# Patient Record
Sex: FEMALE | Race: WHITE | Employment: FULL TIME | URBAN - METROPOLITAN AREA
[De-identification: names, ages, dates, MRNs, and addresses within clinical notes are randomized per-mention and may not be internally consistent; named-entity substitution may affect disease eponyms.]

---

## 2017-04-15 PROCEDURE — 82607 VITAMIN B-12: CPT | Performed by: INTERNAL MEDICINE

## 2017-04-15 PROCEDURE — 82043 UR ALBUMIN QUANTITATIVE: CPT | Performed by: INTERNAL MEDICINE

## 2017-04-15 PROCEDURE — 82570 ASSAY OF URINE CREATININE: CPT | Performed by: INTERNAL MEDICINE

## 2017-08-01 ENCOUNTER — LAB ENCOUNTER (OUTPATIENT)
Dept: LAB | Age: 47
End: 2017-08-01
Attending: INTERNAL MEDICINE
Payer: COMMERCIAL

## 2017-08-01 DIAGNOSIS — E53.8 BIOTIN-(PROPIONYL-COA-CARBOXYLASE) LIGASE DEFICIENCY: Primary | ICD-10-CM

## 2017-08-01 LAB — HAV AB SERPL IA-ACNC: 506 PG/ML (ref 193–986)

## 2017-08-01 PROCEDURE — 82607 VITAMIN B-12: CPT

## 2018-01-16 PROCEDURE — 82746 ASSAY OF FOLIC ACID SERUM: CPT | Performed by: INTERNAL MEDICINE

## 2018-01-16 PROCEDURE — 82607 VITAMIN B-12: CPT | Performed by: INTERNAL MEDICINE

## 2018-01-18 PROCEDURE — 87624 HPV HI-RISK TYP POOLED RSLT: CPT | Performed by: INTERNAL MEDICINE

## 2018-01-18 PROCEDURE — 88175 CYTOPATH C/V AUTO FLUID REDO: CPT | Performed by: INTERNAL MEDICINE

## 2018-05-22 PROCEDURE — 82607 VITAMIN B-12: CPT | Performed by: INTERNAL MEDICINE

## 2018-10-18 PROBLEM — F10.10 ALCOHOL ABUSE: Status: ACTIVE | Noted: 2018-10-18

## 2019-01-18 PROCEDURE — 81001 URINALYSIS AUTO W/SCOPE: CPT | Performed by: INTERNAL MEDICINE

## 2019-02-19 PROBLEM — R07.9 CHEST PAIN, UNSPECIFIED TYPE: Status: ACTIVE | Noted: 2019-02-19

## 2019-05-02 PROBLEM — R07.9 CHEST PAIN, UNSPECIFIED TYPE: Status: RESOLVED | Noted: 2019-02-19 | Resolved: 2019-05-02

## 2020-02-13 PROBLEM — G57.93 NEUROPATHY OF BOTH FEET: Status: ACTIVE | Noted: 2020-02-13

## 2020-02-13 PROBLEM — Z98.890 HISTORY OF ENDOMETRIAL ABLATION: Status: ACTIVE | Noted: 2020-02-13

## 2020-02-13 PROBLEM — D64.9 ANEMIA, UNSPECIFIED TYPE: Status: ACTIVE | Noted: 2020-02-13

## 2020-07-27 PROBLEM — D64.9 ANEMIA, UNSPECIFIED TYPE: Status: RESOLVED | Noted: 2020-02-13 | Resolved: 2020-07-27

## 2020-07-27 PROBLEM — D50.9 IRON DEFICIENCY ANEMIA, UNSPECIFIED IRON DEFICIENCY ANEMIA TYPE: Status: ACTIVE | Noted: 2020-07-27

## 2021-09-16 PROBLEM — R20.0 NUMBNESS IN FEET: Status: ACTIVE | Noted: 2021-09-16

## 2021-09-17 PROBLEM — M77.42 METATARSALGIA OF LEFT FOOT: Status: ACTIVE | Noted: 2021-09-17

## 2021-09-17 PROBLEM — M21.622 TAILOR'S BUNION OF LEFT FOOT: Status: ACTIVE | Noted: 2021-09-17

## 2021-09-17 PROBLEM — L85.3 XEROSIS CUTIS: Status: ACTIVE | Noted: 2021-09-17

## 2021-10-14 PROBLEM — G62.9 POLYNEUROPATHY: Status: ACTIVE | Noted: 2021-10-14

## 2021-10-14 PROBLEM — Z98.84 HISTORY OF GASTRIC BYPASS: Status: ACTIVE | Noted: 2021-10-14

## 2021-10-14 PROBLEM — G62.2 POLYNEUROPATHY DUE TO OTHER TOXIC AGENTS (HCC): Status: ACTIVE | Noted: 2021-10-14

## 2021-10-14 PROBLEM — G62.1 ALCOHOLIC PERIPHERAL NEUROPATHY (HCC): Status: ACTIVE | Noted: 2018-10-18

## 2021-11-03 PROBLEM — Z98.890 STATUS POST FOOT SURGERY: Status: ACTIVE | Noted: 2020-02-13

## 2021-11-03 PROBLEM — Z98.890 STATUS POST LEFT FOOT SURGERY: Status: ACTIVE | Noted: 2020-02-13

## 2021-11-10 PROBLEM — Z48.89 AFTERCARE FOLLOWING SURGERY: Status: ACTIVE | Noted: 2021-11-10

## 2021-12-01 PROBLEM — T81.31XA DEHISCENCE OF OPERATIVE WOUND, INITIAL ENCOUNTER: Status: ACTIVE | Noted: 2021-12-01

## 2022-03-03 PROBLEM — R26.9 GAIT DISTURBANCE: Status: ACTIVE | Noted: 2022-03-03

## 2022-03-03 PROBLEM — R20.2 PARESTHESIAS: Status: ACTIVE | Noted: 2022-03-03

## 2023-03-31 PROBLEM — M86.9 OSTEOMYELITIS (HCC): Status: ACTIVE | Noted: 2023-03-31

## 2023-03-31 RX ORDER — SODIUM CHLORIDE 0.9 % (FLUSH) 0.9 %
10 SYRINGE (ML) INJECTION ONCE
OUTPATIENT
Start: 2023-04-07

## 2023-04-04 ENCOUNTER — TELEPHONE (OUTPATIENT)
Dept: HEMATOLOGY/ONCOLOGY | Facility: HOSPITAL | Age: 53
End: 2023-04-04

## 2023-04-05 ENCOUNTER — DOCUMENTATION ONLY (OUTPATIENT)
Dept: HEMATOLOGY/ONCOLOGY | Facility: HOSPITAL | Age: 53
End: 2023-04-05

## 2023-04-10 ENCOUNTER — NURSE ONLY (OUTPATIENT)
Dept: HEMATOLOGY/ONCOLOGY | Facility: HOSPITAL | Age: 53
End: 2023-04-10
Attending: INTERNAL MEDICINE
Payer: COMMERCIAL

## 2023-04-10 DIAGNOSIS — M86.9 OSTEOMYELITIS (HCC): Primary | ICD-10-CM

## 2023-04-10 LAB
ALBUMIN SERPL-MCNC: 3.5 G/DL (ref 3.4–5)
ALBUMIN/GLOB SERPL: 0.8 {RATIO} (ref 1–2)
ALP LIVER SERPL-CCNC: 90 U/L
ALT SERPL-CCNC: 36 U/L
ANION GAP SERPL CALC-SCNC: 10 MMOL/L (ref 0–18)
AST SERPL-CCNC: 26 U/L (ref 15–37)
BASOPHILS # BLD AUTO: 0.05 X10(3) UL (ref 0–0.2)
BASOPHILS NFR BLD AUTO: 0.5 %
BILIRUB SERPL-MCNC: 0.3 MG/DL (ref 0.1–2)
BUN BLD-MCNC: 10 MG/DL (ref 7–18)
CALCIUM BLD-MCNC: 9.2 MG/DL (ref 8.5–10.1)
CHLORIDE SERPL-SCNC: 106 MMOL/L (ref 98–112)
CK SERPL-CCNC: 145 U/L
CO2 SERPL-SCNC: 24 MMOL/L (ref 21–32)
CREAT BLD-MCNC: 1.19 MG/DL
EOSINOPHIL # BLD AUTO: 0.34 X10(3) UL (ref 0–0.7)
EOSINOPHIL NFR BLD AUTO: 3.7 %
ERYTHROCYTE [DISTWIDTH] IN BLOOD BY AUTOMATED COUNT: 13.1 %
FASTING STATUS PATIENT QL REPORTED: NO
GFR SERPLBLD BASED ON 1.73 SQ M-ARVRAT: 55 ML/MIN/1.73M2 (ref 60–?)
GLOBULIN PLAS-MCNC: 4.2 G/DL (ref 2.8–4.4)
GLUCOSE BLD-MCNC: 126 MG/DL (ref 70–99)
HCT VFR BLD AUTO: 36.2 %
HGB BLD-MCNC: 11.3 G/DL
IMM GRANULOCYTES # BLD AUTO: 0.02 X10(3) UL (ref 0–1)
IMM GRANULOCYTES NFR BLD: 0.2 %
LYMPHOCYTES # BLD AUTO: 2.89 X10(3) UL (ref 1–4)
LYMPHOCYTES NFR BLD AUTO: 31.8 %
MCH RBC QN AUTO: 27.5 PG (ref 26–34)
MCHC RBC AUTO-ENTMCNC: 31.2 G/DL (ref 31–37)
MCV RBC AUTO: 88.1 FL
MONOCYTES # BLD AUTO: 0.96 X10(3) UL (ref 0.1–1)
MONOCYTES NFR BLD AUTO: 10.5 %
NEUTROPHILS # BLD AUTO: 4.84 X10 (3) UL (ref 1.5–7.7)
NEUTROPHILS # BLD AUTO: 4.84 X10(3) UL (ref 1.5–7.7)
NEUTROPHILS NFR BLD AUTO: 53.3 %
OSMOLALITY SERPL CALC.SUM OF ELEC: 291 MOSM/KG (ref 275–295)
PLATELET # BLD AUTO: 271 10(3)UL (ref 150–450)
POTASSIUM SERPL-SCNC: 3.3 MMOL/L (ref 3.5–5.1)
PROT SERPL-MCNC: 7.7 G/DL (ref 6.4–8.2)
RBC # BLD AUTO: 4.11 X10(6)UL
SODIUM SERPL-SCNC: 140 MMOL/L (ref 136–145)
WBC # BLD AUTO: 9.1 X10(3) UL (ref 4–11)

## 2023-04-10 PROCEDURE — 85025 COMPLETE CBC W/AUTO DIFF WBC: CPT

## 2023-04-10 PROCEDURE — 80053 COMPREHEN METABOLIC PANEL: CPT

## 2023-04-10 PROCEDURE — 36592 COLLECT BLOOD FROM PICC: CPT

## 2023-04-10 PROCEDURE — 82550 ASSAY OF CK (CPK): CPT

## 2023-04-10 RX ORDER — SODIUM CHLORIDE 0.9 % (FLUSH) 0.9 %
10 SYRINGE (ML) INJECTION ONCE
OUTPATIENT
Start: 2023-04-17

## 2023-04-10 NOTE — PROGRESS NOTES
Education Record    Learner:  Patient    Disease / Diagnosis: here for labs    Barriers / Limitations:  None   Comments:    Method:  Brief focused and Discussion   Comments:    General Topics:  Procedure and Plan of care reviewed   Comments:    Outcome:  Shows understanding   Comments:  Labs drawn without incident. PICC with stat lock in place upon arrival. PICC dressing changed per protocol with tissue adhesive. Stat lock not used. Extension tubing added per pt request. Bruising noted to inner right arm above insertion site. Pt states they had a hard time placing PICC and tried twice. Pt with continuous pump for antibiotics. Pt stopped antibiotic for lab draw and then restarted. Reviewed next appointment. Discharged home in stable condition, no new complaints.

## 2023-04-17 ENCOUNTER — NURSE ONLY (OUTPATIENT)
Dept: HEMATOLOGY/ONCOLOGY | Facility: HOSPITAL | Age: 53
End: 2023-04-17
Attending: INTERNAL MEDICINE
Payer: COMMERCIAL

## 2023-04-17 DIAGNOSIS — M86.9 OSTEOMYELITIS (HCC): Primary | ICD-10-CM

## 2023-04-17 LAB
ALBUMIN SERPL-MCNC: 3.4 G/DL (ref 3.4–5)
ALBUMIN/GLOB SERPL: 0.8 {RATIO} (ref 1–2)
ALP LIVER SERPL-CCNC: 92 U/L
ALT SERPL-CCNC: 34 U/L
ANION GAP SERPL CALC-SCNC: 7 MMOL/L (ref 0–18)
AST SERPL-CCNC: 36 U/L (ref 15–37)
BASOPHILS # BLD AUTO: 0.05 X10(3) UL (ref 0–0.2)
BASOPHILS NFR BLD AUTO: 0.5 %
BILIRUB SERPL-MCNC: 0.3 MG/DL (ref 0.1–2)
BUN BLD-MCNC: 14 MG/DL (ref 7–18)
CALCIUM BLD-MCNC: 9 MG/DL (ref 8.5–10.1)
CHLORIDE SERPL-SCNC: 108 MMOL/L (ref 98–112)
CK SERPL-CCNC: 180 U/L
CO2 SERPL-SCNC: 21 MMOL/L (ref 21–32)
CREAT BLD-MCNC: 1.33 MG/DL
EOSINOPHIL # BLD AUTO: 0.29 X10(3) UL (ref 0–0.7)
EOSINOPHIL NFR BLD AUTO: 3.2 %
ERYTHROCYTE [DISTWIDTH] IN BLOOD BY AUTOMATED COUNT: 13.2 %
FASTING STATUS PATIENT QL REPORTED: NO
GFR SERPLBLD BASED ON 1.73 SQ M-ARVRAT: 48 ML/MIN/1.73M2 (ref 60–?)
GLOBULIN PLAS-MCNC: 4.3 G/DL (ref 2.8–4.4)
GLUCOSE BLD-MCNC: 192 MG/DL (ref 70–99)
HCT VFR BLD AUTO: 36.7 %
HGB BLD-MCNC: 11.5 G/DL
IMM GRANULOCYTES # BLD AUTO: 0.03 X10(3) UL (ref 0–1)
IMM GRANULOCYTES NFR BLD: 0.3 %
LYMPHOCYTES # BLD AUTO: 2.93 X10(3) UL (ref 1–4)
LYMPHOCYTES NFR BLD AUTO: 31.8 %
MCH RBC QN AUTO: 27.6 PG (ref 26–34)
MCHC RBC AUTO-ENTMCNC: 31.3 G/DL (ref 31–37)
MCV RBC AUTO: 88.2 FL
MONOCYTES # BLD AUTO: 0.93 X10(3) UL (ref 0.1–1)
MONOCYTES NFR BLD AUTO: 10.1 %
NEUTROPHILS # BLD AUTO: 4.97 X10 (3) UL (ref 1.5–7.7)
NEUTROPHILS # BLD AUTO: 4.97 X10(3) UL (ref 1.5–7.7)
NEUTROPHILS NFR BLD AUTO: 54.1 %
OSMOLALITY SERPL CALC.SUM OF ELEC: 288 MOSM/KG (ref 275–295)
PLATELET # BLD AUTO: 286 10(3)UL (ref 150–450)
POTASSIUM SERPL-SCNC: 3.7 MMOL/L (ref 3.5–5.1)
PROT SERPL-MCNC: 7.7 G/DL (ref 6.4–8.2)
RBC # BLD AUTO: 4.16 X10(6)UL
SODIUM SERPL-SCNC: 136 MMOL/L (ref 136–145)
WBC # BLD AUTO: 9.2 X10(3) UL (ref 4–11)

## 2023-04-17 PROCEDURE — 36592 COLLECT BLOOD FROM PICC: CPT

## 2023-04-17 PROCEDURE — 82550 ASSAY OF CK (CPK): CPT

## 2023-04-17 PROCEDURE — 85025 COMPLETE CBC W/AUTO DIFF WBC: CPT

## 2023-04-17 PROCEDURE — 80053 COMPREHEN METABOLIC PANEL: CPT

## 2023-04-17 RX ORDER — SODIUM CHLORIDE 0.9 % (FLUSH) 0.9 %
10 SYRINGE (ML) INJECTION ONCE
OUTPATIENT
Start: 2023-04-24

## 2023-04-17 NOTE — PROGRESS NOTES
Education Record    Learner:  Patient    Disease / Diagnosis: osteomyelitis    Barriers / Limitations:  None    Method:  Brief focused, printed material and  reinforcement    General Topics:  Plan of care reviewed    Outcome:  Shows understanding    PICC dressing changed, labs drawn, pt with continuous zosyn pump.  Has next appt

## 2023-04-24 ENCOUNTER — TELEPHONE (OUTPATIENT)
Dept: HEMATOLOGY/ONCOLOGY | Facility: HOSPITAL | Age: 53
End: 2023-04-24

## 2023-04-24 ENCOUNTER — NURSE ONLY (OUTPATIENT)
Dept: HEMATOLOGY/ONCOLOGY | Facility: HOSPITAL | Age: 53
End: 2023-04-24
Attending: INTERNAL MEDICINE
Payer: COMMERCIAL

## 2023-04-24 DIAGNOSIS — M86.9 OSTEOMYELITIS (HCC): Primary | ICD-10-CM

## 2023-04-24 LAB
ALBUMIN SERPL-MCNC: 3.6 G/DL (ref 3.4–5)
ALBUMIN/GLOB SERPL: 0.8 {RATIO} (ref 1–2)
ALP LIVER SERPL-CCNC: 97 U/L
ALT SERPL-CCNC: 40 U/L
ANION GAP SERPL CALC-SCNC: 14 MMOL/L (ref 0–18)
AST SERPL-CCNC: 36 U/L (ref 15–37)
BASOPHILS # BLD AUTO: 0.06 X10(3) UL (ref 0–0.2)
BASOPHILS NFR BLD AUTO: 0.6 %
BILIRUB SERPL-MCNC: 0.4 MG/DL (ref 0.1–2)
BUN BLD-MCNC: 11 MG/DL (ref 7–18)
CALCIUM BLD-MCNC: 9 MG/DL (ref 8.5–10.1)
CHLORIDE SERPL-SCNC: 104 MMOL/L (ref 98–112)
CK SERPL-CCNC: 202 U/L
CO2 SERPL-SCNC: 21 MMOL/L (ref 21–32)
CREAT BLD-MCNC: 1.39 MG/DL
EOSINOPHIL # BLD AUTO: 0.53 X10(3) UL (ref 0–0.7)
EOSINOPHIL NFR BLD AUTO: 5.1 %
ERYTHROCYTE [DISTWIDTH] IN BLOOD BY AUTOMATED COUNT: 13.2 %
FASTING STATUS PATIENT QL REPORTED: NO
GFR SERPLBLD BASED ON 1.73 SQ M-ARVRAT: 45 ML/MIN/1.73M2 (ref 60–?)
GLOBULIN PLAS-MCNC: 4.4 G/DL (ref 2.8–4.4)
GLUCOSE BLD-MCNC: 207 MG/DL (ref 70–99)
HCT VFR BLD AUTO: 37.4 %
HGB BLD-MCNC: 11.9 G/DL
IMM GRANULOCYTES # BLD AUTO: 0.02 X10(3) UL (ref 0–1)
IMM GRANULOCYTES NFR BLD: 0.2 %
LYMPHOCYTES # BLD AUTO: 3.52 X10(3) UL (ref 1–4)
LYMPHOCYTES NFR BLD AUTO: 33.6 %
MCH RBC QN AUTO: 27.9 PG (ref 26–34)
MCHC RBC AUTO-ENTMCNC: 31.8 G/DL (ref 31–37)
MCV RBC AUTO: 87.8 FL
MONOCYTES # BLD AUTO: 1.07 X10(3) UL (ref 0.1–1)
MONOCYTES NFR BLD AUTO: 10.2 %
NEUTROPHILS # BLD AUTO: 5.29 X10 (3) UL (ref 1.5–7.7)
NEUTROPHILS # BLD AUTO: 5.29 X10(3) UL (ref 1.5–7.7)
NEUTROPHILS NFR BLD AUTO: 50.3 %
OSMOLALITY SERPL CALC.SUM OF ELEC: 293 MOSM/KG (ref 275–295)
PLATELET # BLD AUTO: 270 10(3)UL (ref 150–450)
POTASSIUM SERPL-SCNC: 2.7 MMOL/L (ref 3.5–5.1)
PROT SERPL-MCNC: 8 G/DL (ref 6.4–8.2)
RBC # BLD AUTO: 4.26 X10(6)UL
SODIUM SERPL-SCNC: 139 MMOL/L (ref 136–145)
WBC # BLD AUTO: 10.5 X10(3) UL (ref 4–11)

## 2023-04-24 PROCEDURE — 80053 COMPREHEN METABOLIC PANEL: CPT

## 2023-04-24 PROCEDURE — 82550 ASSAY OF CK (CPK): CPT

## 2023-04-24 PROCEDURE — 36592 COLLECT BLOOD FROM PICC: CPT

## 2023-04-24 PROCEDURE — 85025 COMPLETE CBC W/AUTO DIFF WBC: CPT

## 2023-04-24 RX ORDER — SODIUM CHLORIDE 0.9 % (FLUSH) 0.9 %
10 SYRINGE (ML) INJECTION ONCE
OUTPATIENT
Start: 2023-05-01

## 2023-04-24 NOTE — PROGRESS NOTES
PICC line dressing changed, Continuous infusion pump ws stopped by patient while dressing was changed.

## 2023-05-01 ENCOUNTER — NURSE ONLY (OUTPATIENT)
Dept: HEMATOLOGY/ONCOLOGY | Facility: HOSPITAL | Age: 53
End: 2023-05-01
Attending: INTERNAL MEDICINE
Payer: COMMERCIAL

## 2023-05-01 DIAGNOSIS — M86.9 OSTEOMYELITIS (HCC): Primary | ICD-10-CM

## 2023-05-01 LAB
ALBUMIN SERPL-MCNC: 3.5 G/DL (ref 3.4–5)
ALBUMIN/GLOB SERPL: 0.8 {RATIO} (ref 1–2)
ALP LIVER SERPL-CCNC: 101 U/L
ALT SERPL-CCNC: 35 U/L
ANION GAP SERPL CALC-SCNC: 5 MMOL/L (ref 0–18)
AST SERPL-CCNC: 36 U/L (ref 15–37)
BASOPHILS # BLD AUTO: 0.05 X10(3) UL (ref 0–0.2)
BASOPHILS NFR BLD AUTO: 0.5 %
BILIRUB SERPL-MCNC: 0.3 MG/DL (ref 0.1–2)
BUN BLD-MCNC: 12 MG/DL (ref 7–18)
CALCIUM BLD-MCNC: 9.1 MG/DL (ref 8.5–10.1)
CHLORIDE SERPL-SCNC: 109 MMOL/L (ref 98–112)
CK SERPL-CCNC: 132 U/L
CO2 SERPL-SCNC: 21 MMOL/L (ref 21–32)
CREAT BLD-MCNC: 1.62 MG/DL
EOSINOPHIL # BLD AUTO: 0.38 X10(3) UL (ref 0–0.7)
EOSINOPHIL NFR BLD AUTO: 3.7 %
ERYTHROCYTE [DISTWIDTH] IN BLOOD BY AUTOMATED COUNT: 13.2 %
GFR SERPLBLD BASED ON 1.73 SQ M-ARVRAT: 38 ML/MIN/1.73M2 (ref 60–?)
GLOBULIN PLAS-MCNC: 4.2 G/DL (ref 2.8–4.4)
GLUCOSE BLD-MCNC: 201 MG/DL (ref 70–99)
HCT VFR BLD AUTO: 38.4 %
HGB BLD-MCNC: 12 G/DL
IMM GRANULOCYTES # BLD AUTO: 0.02 X10(3) UL (ref 0–1)
IMM GRANULOCYTES NFR BLD: 0.2 %
LYMPHOCYTES # BLD AUTO: 3.41 X10(3) UL (ref 1–4)
LYMPHOCYTES NFR BLD AUTO: 32.9 %
MCH RBC QN AUTO: 27.6 PG (ref 26–34)
MCHC RBC AUTO-ENTMCNC: 31.3 G/DL (ref 31–37)
MCV RBC AUTO: 88.3 FL
MONOCYTES # BLD AUTO: 0.9 X10(3) UL (ref 0.1–1)
MONOCYTES NFR BLD AUTO: 8.7 %
NEUTROPHILS # BLD AUTO: 5.6 X10 (3) UL (ref 1.5–7.7)
NEUTROPHILS # BLD AUTO: 5.6 X10(3) UL (ref 1.5–7.7)
NEUTROPHILS NFR BLD AUTO: 54 %
OSMOLALITY SERPL CALC.SUM OF ELEC: 285 MOSM/KG (ref 275–295)
PLATELET # BLD AUTO: 290 10(3)UL (ref 150–450)
POTASSIUM SERPL-SCNC: 4 MMOL/L (ref 3.5–5.1)
PROT SERPL-MCNC: 7.7 G/DL (ref 6.4–8.2)
RBC # BLD AUTO: 4.35 X10(6)UL
SODIUM SERPL-SCNC: 135 MMOL/L (ref 136–145)
WBC # BLD AUTO: 10.4 X10(3) UL (ref 4–11)

## 2023-05-01 PROCEDURE — 85025 COMPLETE CBC W/AUTO DIFF WBC: CPT

## 2023-05-01 PROCEDURE — 82550 ASSAY OF CK (CPK): CPT

## 2023-05-01 PROCEDURE — 36592 COLLECT BLOOD FROM PICC: CPT

## 2023-05-01 PROCEDURE — 80053 COMPREHEN METABOLIC PANEL: CPT

## 2023-05-01 RX ORDER — SODIUM CHLORIDE 0.9 % (FLUSH) 0.9 %
10 SYRINGE (ML) INJECTION ONCE
OUTPATIENT
Start: 2023-05-08

## 2023-05-01 NOTE — PROGRESS NOTES
Education Record    Learner:  Patient    Disease / Diagnosis: Pt here for CLL, dressing change. Barriers / Limitations:  None    Method:  Brief focused, printed material and  reinforcement    General Topics:  Plan of care reviewed    Outcome:  Shows understanding    Per pt request, new extension tubing applied. Pt labs drawn, abx infusion pump paused. Noticed slight redness around insertion site and surrounding skin. Pt new dressing applied. Pt left in stable condition.

## 2023-05-05 ENCOUNTER — OFFICE VISIT (OUTPATIENT)
Dept: WOUND CARE | Facility: HOSPITAL | Age: 53
End: 2023-05-05
Attending: INTERNAL MEDICINE
Payer: COMMERCIAL

## 2023-05-05 VITALS
DIASTOLIC BLOOD PRESSURE: 94 MMHG | WEIGHT: 195 LBS | TEMPERATURE: 97 F | RESPIRATION RATE: 18 BRPM | BODY MASS INDEX: 32.49 KG/M2 | HEIGHT: 65 IN | HEART RATE: 76 BPM | SYSTOLIC BLOOD PRESSURE: 159 MMHG

## 2023-05-05 DIAGNOSIS — T14.8XXA DEEP TISSUE INJURY: ICD-10-CM

## 2023-05-05 DIAGNOSIS — L97.522 DIABETIC ULCER OF TOE OF LEFT FOOT ASSOCIATED WITH TYPE 2 DIABETES MELLITUS, WITH FAT LAYER EXPOSED (HCC): ICD-10-CM

## 2023-05-05 DIAGNOSIS — E11.621 DIABETIC ULCER OF TOE OF LEFT FOOT ASSOCIATED WITH TYPE 2 DIABETES MELLITUS, WITH FAT LAYER EXPOSED (HCC): ICD-10-CM

## 2023-05-05 DIAGNOSIS — M86.172 ACUTE OSTEOMYELITIS OF LEFT FOOT (HCC): ICD-10-CM

## 2023-05-05 DIAGNOSIS — L97.522 ULCER OF LEFT FOOT, WITH FAT LAYER EXPOSED (HCC): Primary | ICD-10-CM

## 2023-05-05 LAB — GLUCOSE BLD-MCNC: 116 MG/DL (ref 70–99)

## 2023-05-05 PROCEDURE — 99214 OFFICE O/P EST MOD 30 MIN: CPT

## 2023-05-05 PROCEDURE — 11042 DBRDMT SUBQ TIS 1ST 20SQCM/<: CPT | Performed by: INTERNAL MEDICINE

## 2023-05-05 PROCEDURE — 82962 GLUCOSE BLOOD TEST: CPT | Performed by: INTERNAL MEDICINE

## 2023-05-05 NOTE — PATIENT INSTRUCTIONS
Wound Cleaning and Dressings:    Wash your hands with soap and water. Always wear gloves while changing dressings. Donot touch wound / francisco-wound skin with un-gloved hands. Remove old dressing, discard and place into trash. Shower with protection  Wash foot with soap and water  Wash wonded area with saline  VASHE soak x 15 min before dressing change  DRESSINGS: honey gel / gauze. Betadine on right toe DTI  Change dressing daily. Off-Loading: Darco shoe with PEG liner    Miscellaneous Instructions:  Supplement with a daily multivitamin   Low salt diet  Intense blood sugar control - Goal Blood sugar below 180 at all times recommended. Increase protein intake / consider protein supplements - see below  Elevate extremities at all times when sitting / laying down. DIETARY MODIFICATIONS TO HELP WITH WOUND HEALING:    Protein: Meats, beans, eggs, milk and yogurt particularly [place] yogurt), tofu, soy nuts, soy protein products    Vitamin C: Citrus fruits and juices, strawberries, tomatoes, tomato juice, peppers, baked potatoes, spinach, broccoli, cauliflower, [place] sprouts, cabbage    Vitamin A: Dark green, leafy vegetables, orange or yellow vegetables, cantaloupe, fortified dairy products, liver, fortified cereals    Zinc: Fortified cereals, red meats, seafood    Consider Jason [employer] (These are essential branch chain amino acids that help with tissue building and wound healing) and take 2 packets/day. you can order online at [employer] or [address] [place] [address] [employer] REMINDERS:    The treatment plan has been discussed at length with you and your provider. Follow all instructions carefully, it is very important. If you do not follow all instructions, you are at  risk of your wound not healing, infection, possible loss of limb and even end of life.   Please call the clinic during regular business hours ( 7:30 AM - 5:30 PM) if you notice increased bleeding, redness, warmth, pain or pus like drainage or start running a fever greater than 100.3. For after hour emergencies, please call your primary physician or go to the nearest emergency room.

## 2023-05-05 NOTE — PROGRESS NOTES
.Weekly Wound Education Note    Teaching Provided To: Patient  Training Topics: Discharge instructions;Dressing;Cleasing and general instructions; Off-loading  Training Method: Explain/Verbal;Written  Training Response: Patient responds and understands        Notes: Inital visit for toe wounds. Start betadine to right toe. Start honey gel, gauze, and medipore tape to left toe. Darco shoe with peg liner made for left foot. Vascular studies ordered this visit. If wounds not improving, consider HBOT.

## 2023-05-05 NOTE — PROGRESS NOTES
Patient ID: Kanu Hall is a 48year old female. Debridement   Wound 05/05/23 #1 left plantar 3rd toe Diabetic Ulcer Toe (Comment which one) Plantar;Left    Consent obtained? verbal  Consent given by: patient  Risks discussed?  procedural risks discussed  Performed by: provider  Debridement type: surgical  Level of debridement: subcutaneous tissue    Pre-debridement measurements  Length (cm): 0.4  Width (cm): 0.9  Depth (cm): 0.1  Surface Area (cm^2): 0.36  Volume (cm^3): 0.04    Post-debridement measurements  Length (cm): 0.5  Width (cm): 1  Depth (cm): 0.2  Percent debrided: 100%  Surface Area (cm^2): 0.5  Area debrided (cm^2): 0.5  Volume (cm^3): 0.1  Tissue and other material debrided: subcutaneous tissue  Devitalized tissue debrided: biofilm, callus and slough  Instrument(s) utilized: blade and forceps  Bleeding: small  Hemostasis obtained with: pressure  Procedural pain (0-10): 0  Post-procedural pain: 1   Response to treatment: procedure was tolerated well

## 2023-05-08 ENCOUNTER — NURSE ONLY (OUTPATIENT)
Dept: HEMATOLOGY/ONCOLOGY | Facility: HOSPITAL | Age: 53
End: 2023-05-08
Attending: INTERNAL MEDICINE
Payer: COMMERCIAL

## 2023-05-08 DIAGNOSIS — M86.9 OSTEOMYELITIS (HCC): Primary | ICD-10-CM

## 2023-05-08 LAB
ALBUMIN SERPL-MCNC: 3.4 G/DL (ref 3.4–5)
ALBUMIN/GLOB SERPL: 0.8 {RATIO} (ref 1–2)
ALP LIVER SERPL-CCNC: 93 U/L
ALT SERPL-CCNC: 49 U/L
ANION GAP SERPL CALC-SCNC: 9 MMOL/L (ref 0–18)
AST SERPL-CCNC: 50 U/L (ref 15–37)
BASOPHILS # BLD AUTO: 0.06 X10(3) UL (ref 0–0.2)
BASOPHILS NFR BLD AUTO: 0.6 %
BILIRUB SERPL-MCNC: 0.4 MG/DL (ref 0.1–2)
BUN BLD-MCNC: 10 MG/DL (ref 7–18)
CALCIUM BLD-MCNC: 8.9 MG/DL (ref 8.5–10.1)
CHLORIDE SERPL-SCNC: 110 MMOL/L (ref 98–112)
CK SERPL-CCNC: 121 U/L
CO2 SERPL-SCNC: 22 MMOL/L (ref 21–32)
CREAT BLD-MCNC: 1.28 MG/DL
EOSINOPHIL # BLD AUTO: 0.26 X10(3) UL (ref 0–0.7)
EOSINOPHIL NFR BLD AUTO: 2.4 %
ERYTHROCYTE [DISTWIDTH] IN BLOOD BY AUTOMATED COUNT: 13.2 %
GFR SERPLBLD BASED ON 1.73 SQ M-ARVRAT: 50 ML/MIN/1.73M2 (ref 60–?)
GLOBULIN PLAS-MCNC: 4.1 G/DL (ref 2.8–4.4)
GLUCOSE BLD-MCNC: 129 MG/DL (ref 70–99)
HCT VFR BLD AUTO: 37.3 %
HGB BLD-MCNC: 11.8 G/DL
IMM GRANULOCYTES # BLD AUTO: 0.03 X10(3) UL (ref 0–1)
IMM GRANULOCYTES NFR BLD: 0.3 %
LYMPHOCYTES # BLD AUTO: 3.37 X10(3) UL (ref 1–4)
LYMPHOCYTES NFR BLD AUTO: 31.2 %
MCH RBC QN AUTO: 27.3 PG (ref 26–34)
MCHC RBC AUTO-ENTMCNC: 31.6 G/DL (ref 31–37)
MCV RBC AUTO: 86.3 FL
MONOCYTES # BLD AUTO: 1.11 X10(3) UL (ref 0.1–1)
MONOCYTES NFR BLD AUTO: 10.3 %
NEUTROPHILS # BLD AUTO: 5.96 X10 (3) UL (ref 1.5–7.7)
NEUTROPHILS # BLD AUTO: 5.96 X10(3) UL (ref 1.5–7.7)
NEUTROPHILS NFR BLD AUTO: 55.2 %
OSMOLALITY SERPL CALC.SUM OF ELEC: 293 MOSM/KG (ref 275–295)
PLATELET # BLD AUTO: 282 10(3)UL (ref 150–450)
POTASSIUM SERPL-SCNC: 4 MMOL/L (ref 3.5–5.1)
PROT SERPL-MCNC: 7.5 G/DL (ref 6.4–8.2)
RBC # BLD AUTO: 4.32 X10(6)UL
SODIUM SERPL-SCNC: 141 MMOL/L (ref 136–145)
WBC # BLD AUTO: 10.8 X10(3) UL (ref 4–11)

## 2023-05-08 PROCEDURE — 85025 COMPLETE CBC W/AUTO DIFF WBC: CPT

## 2023-05-08 PROCEDURE — 82550 ASSAY OF CK (CPK): CPT

## 2023-05-08 PROCEDURE — 80053 COMPREHEN METABOLIC PANEL: CPT

## 2023-05-08 PROCEDURE — 36592 COLLECT BLOOD FROM PICC: CPT

## 2023-05-08 RX ORDER — SODIUM CHLORIDE 0.9 % (FLUSH) 0.9 %
10 SYRINGE (ML) INJECTION ONCE
Status: DISCONTINUED | OUTPATIENT
Start: 2023-05-08 | End: 2023-05-08

## 2023-05-08 RX ORDER — SODIUM CHLORIDE 0.9 % (FLUSH) 0.9 %
10 SYRINGE (ML) INJECTION ONCE
OUTPATIENT
Start: 2023-05-15

## 2023-05-08 NOTE — PROGRESS NOTES
Education Record    Learner:  Patient    Disease / Diagnosis: Pt here for labs and dressing change    Barriers / Limitations:  None    Method:  Brief focused, printed material and  reinforcement    General Topics:  Plan of care reviewed    Outcome:  Shows understanding    Per pt request, new extension tubing applied. Pt labs drawn, abx pump paused. There is still redness around insertion site and surrounding skin. Pt denies pain or discomfort. Applied new dressing. Pt left in stable condition.

## 2023-05-15 ENCOUNTER — NURSE ONLY (OUTPATIENT)
Dept: HEMATOLOGY/ONCOLOGY | Facility: HOSPITAL | Age: 53
End: 2023-05-15
Attending: INTERNAL MEDICINE
Payer: COMMERCIAL

## 2023-05-15 DIAGNOSIS — M86.9 OSTEOMYELITIS (HCC): Primary | ICD-10-CM

## 2023-05-15 LAB
ALBUMIN SERPL-MCNC: 3.3 G/DL (ref 3.4–5)
ALBUMIN/GLOB SERPL: 0.8 {RATIO} (ref 1–2)
ALP LIVER SERPL-CCNC: 98 U/L
ALT SERPL-CCNC: 56 U/L
ANION GAP SERPL CALC-SCNC: 12 MMOL/L (ref 0–18)
AST SERPL-CCNC: 61 U/L (ref 15–37)
BASOPHILS # BLD AUTO: 0.05 X10(3) UL (ref 0–0.2)
BASOPHILS NFR BLD AUTO: 0.5 %
BILIRUB SERPL-MCNC: 0.3 MG/DL (ref 0.1–2)
BUN BLD-MCNC: 10 MG/DL (ref 7–18)
CALCIUM BLD-MCNC: 8.7 MG/DL (ref 8.5–10.1)
CHLORIDE SERPL-SCNC: 106 MMOL/L (ref 98–112)
CK SERPL-CCNC: 111 U/L
CO2 SERPL-SCNC: 19 MMOL/L (ref 21–32)
CREAT BLD-MCNC: 1.47 MG/DL
EOSINOPHIL # BLD AUTO: 0.25 X10(3) UL (ref 0–0.7)
EOSINOPHIL NFR BLD AUTO: 2.7 %
ERYTHROCYTE [DISTWIDTH] IN BLOOD BY AUTOMATED COUNT: 13.2 %
FASTING STATUS PATIENT QL REPORTED: NO
GFR SERPLBLD BASED ON 1.73 SQ M-ARVRAT: 42 ML/MIN/1.73M2 (ref 60–?)
GLOBULIN PLAS-MCNC: 4.2 G/DL (ref 2.8–4.4)
GLUCOSE BLD-MCNC: 243 MG/DL (ref 70–99)
HCT VFR BLD AUTO: 37.6 %
HGB BLD-MCNC: 11.7 G/DL
IMM GRANULOCYTES # BLD AUTO: 0.01 X10(3) UL (ref 0–1)
IMM GRANULOCYTES NFR BLD: 0.1 %
LYMPHOCYTES # BLD AUTO: 3.09 X10(3) UL (ref 1–4)
LYMPHOCYTES NFR BLD AUTO: 33.1 %
MCH RBC QN AUTO: 27.1 PG (ref 26–34)
MCHC RBC AUTO-ENTMCNC: 31.1 G/DL (ref 31–37)
MCV RBC AUTO: 87.2 FL
MONOCYTES # BLD AUTO: 0.97 X10(3) UL (ref 0.1–1)
MONOCYTES NFR BLD AUTO: 10.4 %
NEUTROPHILS # BLD AUTO: 4.97 X10 (3) UL (ref 1.5–7.7)
NEUTROPHILS # BLD AUTO: 4.97 X10(3) UL (ref 1.5–7.7)
NEUTROPHILS NFR BLD AUTO: 53.2 %
OSMOLALITY SERPL CALC.SUM OF ELEC: 291 MOSM/KG (ref 275–295)
PLATELET # BLD AUTO: 287 10(3)UL (ref 150–450)
POTASSIUM SERPL-SCNC: 3.1 MMOL/L (ref 3.5–5.1)
PROT SERPL-MCNC: 7.5 G/DL (ref 6.4–8.2)
RBC # BLD AUTO: 4.31 X10(6)UL
SODIUM SERPL-SCNC: 137 MMOL/L (ref 136–145)
WBC # BLD AUTO: 9.3 X10(3) UL (ref 4–11)

## 2023-05-15 PROCEDURE — 85025 COMPLETE CBC W/AUTO DIFF WBC: CPT

## 2023-05-15 PROCEDURE — 36592 COLLECT BLOOD FROM PICC: CPT

## 2023-05-15 PROCEDURE — 80053 COMPREHEN METABOLIC PANEL: CPT

## 2023-05-15 PROCEDURE — 82550 ASSAY OF CK (CPK): CPT

## 2023-05-15 RX ORDER — SODIUM CHLORIDE 0.9 % (FLUSH) 0.9 %
10 SYRINGE (ML) INJECTION ONCE
OUTPATIENT
Start: 2023-05-22

## 2023-05-15 NOTE — PROGRESS NOTES
Education Record    Learner:  Patient    Disease / Diagnosis: here for labs and dressing change    Barriers / Limitations:  None   Comments:    Method:  Brief focused and Discussion   Comments:    General Topics:  Plan of care reviewed   Comments:    Outcome:  Shows understanding   Comments:    Pt stopped continuous antibiotic for lab draw and dressing change and restarted once completed. Labs drawn and dressing changed per protocol. Extension tubing and blue clave changed. Some redness noted at insertion site and under tubing. Pt denies pain or fevers. Unchanged per patient. Weekly lab and dressing changes to continue until toe amputated per pt. Per charge RN, Riri Dumas, new order faxed over per MD today. Reviewed next appointments. Discharged in stable condition, no new complaints.

## 2023-05-19 ENCOUNTER — APPOINTMENT (OUTPATIENT)
Dept: WOUND CARE | Facility: HOSPITAL | Age: 53
End: 2023-05-19
Attending: INTERNAL MEDICINE
Payer: COMMERCIAL

## 2023-05-20 ENCOUNTER — HOSPITAL ENCOUNTER (EMERGENCY)
Facility: HOSPITAL | Age: 53
Discharge: HOME | End: 2023-05-20
Attending: EMERGENCY MEDICINE
Payer: COMMERCIAL

## 2023-05-20 VITALS
HEART RATE: 78 BPM | HEIGHT: 65 IN | DIASTOLIC BLOOD PRESSURE: 78 MMHG | TEMPERATURE: 97 F | BODY MASS INDEX: 32.82 KG/M2 | SYSTOLIC BLOOD PRESSURE: 114 MMHG | RESPIRATION RATE: 20 BRPM | OXYGEN SATURATION: 98 % | WEIGHT: 197 LBS

## 2023-05-20 DIAGNOSIS — M86.9 OSTEOMYELITIS, UNSPECIFIED SITE, UNSPECIFIED TYPE (HCC): Primary | ICD-10-CM

## 2023-05-20 DIAGNOSIS — Z45.2 PICC (PERIPHERALLY INSERTED CENTRAL CATHETER) REMOVAL: ICD-10-CM

## 2023-05-20 RX ORDER — DOXYCYCLINE HYCLATE 100 MG/1
100 CAPSULE ORAL 2 TIMES DAILY
Qty: 28 CAPSULE | Refills: 0 | Status: SHIPPED | OUTPATIENT
Start: 2023-05-20 | End: 2023-05-29

## 2023-05-20 RX ORDER — DOXYCYCLINE HYCLATE 100 MG/1
100 CAPSULE ORAL ONCE
Status: COMPLETED | OUTPATIENT
Start: 2023-05-20 | End: 2023-05-20

## 2023-05-20 RX ORDER — AMOXICILLIN AND CLAVULANATE POTASSIUM 875; 125 MG/1; MG/1
1 TABLET, FILM COATED ORAL 2 TIMES DAILY
Qty: 28 TABLET | Refills: 0 | Status: SHIPPED | OUTPATIENT
Start: 2023-05-20 | End: 2023-05-29

## 2023-05-20 RX ORDER — AMOXICILLIN AND CLAVULANATE POTASSIUM 875; 125 MG/1; MG/1
875 TABLET, FILM COATED ORAL ONCE
Status: COMPLETED | OUTPATIENT
Start: 2023-05-20 | End: 2023-05-20

## 2023-05-20 NOTE — ED QUICK NOTES
Attempted to flush PICC line. Site leaking just above device securement site. No blood return noted.

## 2023-05-20 NOTE — DISCHARGE INSTRUCTIONS
Continue Augmentin and doxycycline twice a day unless directed otherwise by Dr. Tomas Dose  Return for any problems

## 2023-05-20 NOTE — ED INITIAL ASSESSMENT (HPI)
Pt noticed today that her PICC line has been slowly coming out of its site. Line is also leaking in front of butterfly. Denies any SOB, chest discomfort.

## 2023-05-21 NOTE — ED QUICK NOTES
Right PICC line removed per PICC like removal policy. Pt tolerated well. Resting comfortably supine in bed. PICC measuring 47 cm and catheter tip intact.

## 2023-05-22 ENCOUNTER — APPOINTMENT (OUTPATIENT)
Dept: HEMATOLOGY/ONCOLOGY | Facility: HOSPITAL | Age: 53
End: 2023-05-22
Attending: INTERNAL MEDICINE
Payer: COMMERCIAL

## 2023-05-25 ENCOUNTER — HOSPITAL ENCOUNTER (INPATIENT)
Facility: HOSPITAL | Age: 53
LOS: 4 days | Discharge: HOME | DRG: 618 | End: 2023-05-29
Attending: STUDENT IN AN ORGANIZED HEALTH CARE EDUCATION/TRAINING PROGRAM | Admitting: HOSPITALIST
Payer: COMMERCIAL

## 2023-05-25 DIAGNOSIS — E11.69 TYPE 2 DIABETES MELLITUS WITH OTHER SPECIFIED COMPLICATION, WITHOUT LONG-TERM CURRENT USE OF INSULIN (HCC): ICD-10-CM

## 2023-05-25 DIAGNOSIS — I10 HYPERTENSION, UNSPECIFIED TYPE: ICD-10-CM

## 2023-05-25 DIAGNOSIS — R52 PAIN: ICD-10-CM

## 2023-05-25 DIAGNOSIS — M86.9 OSTEOMYELITIS OF LEFT FOOT, UNSPECIFIED TYPE (HCC): Primary | ICD-10-CM

## 2023-05-25 LAB
ALBUMIN SERPL-MCNC: 3.6 G/DL (ref 3.4–5)
ALBUMIN/GLOB SERPL: 0.8 {RATIO} (ref 1–2)
ALP LIVER SERPL-CCNC: 111 U/L
ALT SERPL-CCNC: 54 U/L
ANION GAP SERPL CALC-SCNC: 8 MMOL/L (ref 0–18)
AST SERPL-CCNC: 55 U/L (ref 15–37)
BASOPHILS # BLD AUTO: 0.07 X10(3) UL (ref 0–0.2)
BASOPHILS NFR BLD AUTO: 0.7 %
BILIRUB SERPL-MCNC: 0.5 MG/DL (ref 0.1–2)
BUN BLD-MCNC: 12 MG/DL (ref 7–18)
CALCIUM BLD-MCNC: 9.7 MG/DL (ref 8.5–10.1)
CHLORIDE SERPL-SCNC: 106 MMOL/L (ref 98–112)
CO2 SERPL-SCNC: 22 MMOL/L (ref 21–32)
CREAT BLD-MCNC: 1.09 MG/DL
CRP SERPL-MCNC: 0.37 MG/DL (ref ?–0.3)
EOSINOPHIL # BLD AUTO: 0.21 X10(3) UL (ref 0–0.7)
EOSINOPHIL NFR BLD AUTO: 2 %
ERYTHROCYTE [DISTWIDTH] IN BLOOD BY AUTOMATED COUNT: 13.1 %
ERYTHROCYTE [SEDIMENTATION RATE] IN BLOOD: 50 MM/HR
GFR SERPLBLD BASED ON 1.73 SQ M-ARVRAT: 61 ML/MIN/1.73M2 (ref 60–?)
GLOBULIN PLAS-MCNC: 4.6 G/DL (ref 2.8–4.4)
GLUCOSE BLD-MCNC: 91 MG/DL (ref 70–99)
GLUCOSE BLD-MCNC: 99 MG/DL (ref 70–99)
HCT VFR BLD AUTO: 40.5 %
HGB BLD-MCNC: 12.9 G/DL
IMM GRANULOCYTES # BLD AUTO: 0.04 X10(3) UL (ref 0–1)
IMM GRANULOCYTES NFR BLD: 0.4 %
LACTATE SERPL-SCNC: 1.9 MMOL/L (ref 0.4–2)
LYMPHOCYTES # BLD AUTO: 3.47 X10(3) UL (ref 1–4)
LYMPHOCYTES NFR BLD AUTO: 32.3 %
MCH RBC QN AUTO: 27 PG (ref 26–34)
MCHC RBC AUTO-ENTMCNC: 31.9 G/DL (ref 31–37)
MCV RBC AUTO: 84.7 FL
MONOCYTES # BLD AUTO: 1.38 X10(3) UL (ref 0.1–1)
MONOCYTES NFR BLD AUTO: 12.8 %
NEUTROPHILS # BLD AUTO: 5.57 X10 (3) UL (ref 1.5–7.7)
NEUTROPHILS # BLD AUTO: 5.57 X10(3) UL (ref 1.5–7.7)
NEUTROPHILS NFR BLD AUTO: 51.8 %
OSMOLALITY SERPL CALC.SUM OF ELEC: 282 MOSM/KG (ref 275–295)
PLATELET # BLD AUTO: 344 10(3)UL (ref 150–450)
POTASSIUM SERPL-SCNC: 4 MMOL/L (ref 3.5–5.1)
PROT SERPL-MCNC: 8.2 G/DL (ref 6.4–8.2)
RBC # BLD AUTO: 4.78 X10(6)UL
SODIUM SERPL-SCNC: 136 MMOL/L (ref 136–145)
WBC # BLD AUTO: 10.7 X10(3) UL (ref 4–11)

## 2023-05-25 PROCEDURE — 87040 BLOOD CULTURE FOR BACTERIA: CPT | Performed by: STUDENT IN AN ORGANIZED HEALTH CARE EDUCATION/TRAINING PROGRAM

## 2023-05-25 PROCEDURE — 85652 RBC SED RATE AUTOMATED: CPT | Performed by: HOSPITALIST

## 2023-05-25 PROCEDURE — 87493 C DIFF AMPLIFIED PROBE: CPT | Performed by: HOSPITALIST

## 2023-05-25 PROCEDURE — 83605 ASSAY OF LACTIC ACID: CPT | Performed by: STUDENT IN AN ORGANIZED HEALTH CARE EDUCATION/TRAINING PROGRAM

## 2023-05-25 PROCEDURE — 83036 HEMOGLOBIN GLYCOSYLATED A1C: CPT | Performed by: HOSPITALIST

## 2023-05-25 PROCEDURE — 82962 GLUCOSE BLOOD TEST: CPT

## 2023-05-25 PROCEDURE — 85025 COMPLETE CBC W/AUTO DIFF WBC: CPT | Performed by: STUDENT IN AN ORGANIZED HEALTH CARE EDUCATION/TRAINING PROGRAM

## 2023-05-25 PROCEDURE — 86140 C-REACTIVE PROTEIN: CPT | Performed by: HOSPITALIST

## 2023-05-25 PROCEDURE — 80053 COMPREHEN METABOLIC PANEL: CPT | Performed by: STUDENT IN AN ORGANIZED HEALTH CARE EDUCATION/TRAINING PROGRAM

## 2023-05-25 RX ORDER — BISACODYL 10 MG
10 SUPPOSITORY, RECTAL RECTAL
Status: DISCONTINUED | OUTPATIENT
Start: 2023-05-25 | End: 2023-05-29

## 2023-05-25 RX ORDER — HYDROMORPHONE HYDROCHLORIDE 1 MG/ML
0.5 INJECTION, SOLUTION INTRAMUSCULAR; INTRAVENOUS; SUBCUTANEOUS EVERY 30 MIN PRN
Status: ACTIVE | OUTPATIENT
Start: 2023-05-25 | End: 2023-05-25

## 2023-05-25 RX ORDER — SODIUM CHLORIDE 9 MG/ML
INJECTION, SOLUTION INTRAVENOUS CONTINUOUS
Status: ACTIVE | OUTPATIENT
Start: 2023-05-25 | End: 2023-05-25

## 2023-05-25 RX ORDER — MONTELUKAST SODIUM 10 MG/1
10 TABLET ORAL EVERY EVENING
Status: DISCONTINUED | OUTPATIENT
Start: 2023-05-26 | End: 2023-05-29

## 2023-05-25 RX ORDER — SODIUM CHLORIDE 9 MG/ML
INJECTION, SOLUTION INTRAVENOUS CONTINUOUS
Status: DISCONTINUED | OUTPATIENT
Start: 2023-05-25 | End: 2023-05-29

## 2023-05-25 RX ORDER — LOSARTAN POTASSIUM 100 MG/1
100 TABLET ORAL DAILY
Status: DISCONTINUED | OUTPATIENT
Start: 2023-05-26 | End: 2023-05-29

## 2023-05-25 RX ORDER — POTASSIUM CHLORIDE 20 MEQ/1
20 TABLET, EXTENDED RELEASE ORAL DAILY
COMMUNITY
Start: 2023-05-18 | End: 2023-05-29

## 2023-05-25 RX ORDER — POLYETHYLENE GLYCOL 3350 17 G/17G
17 POWDER, FOR SOLUTION ORAL DAILY PRN
Status: DISCONTINUED | OUTPATIENT
Start: 2023-05-25 | End: 2023-05-29

## 2023-05-25 RX ORDER — DEXTROSE MONOHYDRATE 25 G/50ML
50 INJECTION, SOLUTION INTRAVENOUS
Status: DISCONTINUED | OUTPATIENT
Start: 2023-05-25 | End: 2023-05-29

## 2023-05-25 RX ORDER — ATORVASTATIN CALCIUM 20 MG/1
20 TABLET, FILM COATED ORAL DAILY
Status: DISCONTINUED | OUTPATIENT
Start: 2023-05-25 | End: 2023-05-25

## 2023-05-25 RX ORDER — HYDROCODONE BITARTRATE AND ACETAMINOPHEN 5; 325 MG/1; MG/1
2 TABLET ORAL EVERY 4 HOURS PRN
Status: DISCONTINUED | OUTPATIENT
Start: 2023-05-25 | End: 2023-05-29

## 2023-05-25 RX ORDER — HYDROCODONE BITARTRATE AND ACETAMINOPHEN 5; 325 MG/1; MG/1
1 TABLET ORAL EVERY 4 HOURS PRN
Status: DISCONTINUED | OUTPATIENT
Start: 2023-05-25 | End: 2023-05-29

## 2023-05-25 RX ORDER — NICOTINE POLACRILEX 4 MG
30 LOZENGE BUCCAL
Status: DISCONTINUED | OUTPATIENT
Start: 2023-05-25 | End: 2023-05-29

## 2023-05-25 RX ORDER — NICOTINE POLACRILEX 4 MG
15 LOZENGE BUCCAL
Status: DISCONTINUED | OUTPATIENT
Start: 2023-05-25 | End: 2023-05-29

## 2023-05-25 RX ORDER — ONDANSETRON 2 MG/ML
4 INJECTION INTRAMUSCULAR; INTRAVENOUS EVERY 4 HOURS PRN
Status: DISCONTINUED | OUTPATIENT
Start: 2023-05-25 | End: 2023-05-25

## 2023-05-25 RX ORDER — PROCHLORPERAZINE EDISYLATE 5 MG/ML
5 INJECTION INTRAMUSCULAR; INTRAVENOUS EVERY 8 HOURS PRN
Status: DISCONTINUED | OUTPATIENT
Start: 2023-05-25 | End: 2023-05-29

## 2023-05-25 RX ORDER — LEVOTHYROXINE SODIUM 0.05 MG/1
50 TABLET ORAL DAILY
Status: DISCONTINUED | OUTPATIENT
Start: 2023-05-26 | End: 2023-05-26

## 2023-05-25 RX ORDER — VANCOMYCIN HYDROCHLORIDE
15 EVERY 12 HOURS
Status: DISCONTINUED | OUTPATIENT
Start: 2023-05-25 | End: 2023-05-25

## 2023-05-25 RX ORDER — ALBUTEROL SULFATE 90 UG/1
2 AEROSOL, METERED RESPIRATORY (INHALATION) EVERY 6 HOURS PRN
Status: DISCONTINUED | OUTPATIENT
Start: 2023-05-25 | End: 2023-05-29

## 2023-05-25 RX ORDER — MORPHINE SULFATE 4 MG/ML
4 INJECTION, SOLUTION INTRAMUSCULAR; INTRAVENOUS EVERY 2 HOUR PRN
Status: DISCONTINUED | OUTPATIENT
Start: 2023-05-25 | End: 2023-05-29

## 2023-05-25 RX ORDER — SENNOSIDES 8.6 MG
17.2 TABLET ORAL NIGHTLY PRN
Status: DISCONTINUED | OUTPATIENT
Start: 2023-05-25 | End: 2023-05-29

## 2023-05-25 RX ORDER — VENLAFAXINE HYDROCHLORIDE 75 MG/1
150 CAPSULE, EXTENDED RELEASE ORAL DAILY
Status: DISCONTINUED | OUTPATIENT
Start: 2023-05-26 | End: 2023-05-29

## 2023-05-25 RX ORDER — MORPHINE SULFATE 2 MG/ML
1 INJECTION, SOLUTION INTRAMUSCULAR; INTRAVENOUS EVERY 2 HOUR PRN
Status: DISCONTINUED | OUTPATIENT
Start: 2023-05-25 | End: 2023-05-29

## 2023-05-25 RX ORDER — ONDANSETRON 2 MG/ML
4 INJECTION INTRAMUSCULAR; INTRAVENOUS EVERY 6 HOURS PRN
Status: DISCONTINUED | OUTPATIENT
Start: 2023-05-25 | End: 2023-05-29

## 2023-05-25 RX ORDER — ACETAMINOPHEN 325 MG/1
650 TABLET ORAL EVERY 4 HOURS PRN
Status: DISCONTINUED | OUTPATIENT
Start: 2023-05-25 | End: 2023-05-29

## 2023-05-25 RX ORDER — MORPHINE SULFATE 2 MG/ML
2 INJECTION, SOLUTION INTRAMUSCULAR; INTRAVENOUS EVERY 2 HOUR PRN
Status: DISCONTINUED | OUTPATIENT
Start: 2023-05-25 | End: 2023-05-29

## 2023-05-25 RX ORDER — HEPARIN SODIUM 5000 [USP'U]/ML
5000 INJECTION, SOLUTION INTRAVENOUS; SUBCUTANEOUS EVERY 12 HOURS SCHEDULED
Status: DISCONTINUED | OUTPATIENT
Start: 2023-05-25 | End: 2023-05-29

## 2023-05-25 NOTE — ED QUICK NOTES
Orders for admission, patient is aware of plan and ready to go upstairs. Any questions, please call ED RN Swapna Truong at extension [phone]    Vaccinated?  Yes    Titratable drug(s) infusing:   Rate: N/A    LOC at time of transport: A&O x4    Other pertinent information:     CIWA score=  NIH score=

## 2023-05-25 NOTE — ED INITIAL ASSESSMENT (HPI)
Patient diagnosed w/ osteomyelitis on her middle toe left foot in early April. Had picc line for iv abx. Picc line was leaking on Saturday and removed, patient then was placed on po abx. Patient complains of low grade fevers and increased pain to the area started on Sunday. Patient advised to come to ER by infectious diseases for admission.

## 2023-05-25 NOTE — PROGRESS NOTES
ED Antibiotic Dose Adjustment by Pharmacy    Vancomycin 2000 mg IVPB x1 dose (~23 mg/kg) has been ordered in the ED as empiric therapy for L toe osteomyelitis. Pharmacy has adjusted the dose to vancomycin 1000 mg IVPB x1 dose (~12.5 mg/kg based on actual body weight of 86.2 kg) as actual body weight is greater than 40% ideal body weight. Please re-consult pharmacy for further dosing if patient is to continue vancomycin after admission.     Thank you,  Denise Diallo, PharmD, BCPS  05/25/23; 2:44 PM

## 2023-05-26 ENCOUNTER — ANESTHESIA (OUTPATIENT)
Dept: SURGERY | Facility: HOSPITAL | Age: 53
End: 2023-05-26
Payer: COMMERCIAL

## 2023-05-26 ENCOUNTER — APPOINTMENT (OUTPATIENT)
Dept: GENERAL RADIOLOGY | Facility: HOSPITAL | Age: 53
DRG: 618 | End: 2023-05-26
Attending: PODIATRIST
Payer: COMMERCIAL

## 2023-05-26 ENCOUNTER — ANESTHESIA EVENT (OUTPATIENT)
Dept: SURGERY | Facility: HOSPITAL | Age: 53
End: 2023-05-26
Payer: COMMERCIAL

## 2023-05-26 ENCOUNTER — APPOINTMENT (OUTPATIENT)
Dept: ULTRASOUND IMAGING | Facility: HOSPITAL | Age: 53
DRG: 618 | End: 2023-05-26
Attending: PODIATRIST
Payer: COMMERCIAL

## 2023-05-26 LAB
ANION GAP SERPL CALC-SCNC: 5 MMOL/L (ref 0–18)
BASOPHILS # BLD AUTO: 0.03 X10(3) UL (ref 0–0.2)
BASOPHILS NFR BLD AUTO: 0.4 %
BUN BLD-MCNC: 11 MG/DL (ref 7–18)
C DIFF TOX B STL QL: NEGATIVE
CALCIUM BLD-MCNC: 8.6 MG/DL (ref 8.5–10.1)
CHLORIDE SERPL-SCNC: 111 MMOL/L (ref 98–112)
CO2 SERPL-SCNC: 25 MMOL/L (ref 21–32)
CREAT BLD-MCNC: 1.09 MG/DL
EOSINOPHIL # BLD AUTO: 0.18 X10(3) UL (ref 0–0.7)
EOSINOPHIL NFR BLD AUTO: 2.2 %
ERYTHROCYTE [DISTWIDTH] IN BLOOD BY AUTOMATED COUNT: 13.2 %
EST. AVERAGE GLUCOSE BLD GHB EST-MCNC: 134 MG/DL (ref 68–126)
GFR SERPLBLD BASED ON 1.73 SQ M-ARVRAT: 61 ML/MIN/1.73M2 (ref 60–?)
GLUCOSE BLD-MCNC: 102 MG/DL (ref 70–99)
GLUCOSE BLD-MCNC: 108 MG/DL (ref 70–99)
GLUCOSE BLD-MCNC: 113 MG/DL (ref 70–99)
GLUCOSE BLD-MCNC: 117 MG/DL (ref 70–99)
GLUCOSE BLD-MCNC: 127 MG/DL (ref 70–99)
HBA1C MFR BLD: 6.3 % (ref ?–5.7)
HCT VFR BLD AUTO: 35.9 %
HGB BLD-MCNC: 11.1 G/DL
IMM GRANULOCYTES # BLD AUTO: 0.03 X10(3) UL (ref 0–1)
IMM GRANULOCYTES NFR BLD: 0.4 %
LYMPHOCYTES # BLD AUTO: 3.17 X10(3) UL (ref 1–4)
LYMPHOCYTES NFR BLD AUTO: 39 %
MCH RBC QN AUTO: 27.1 PG (ref 26–34)
MCHC RBC AUTO-ENTMCNC: 30.9 G/DL (ref 31–37)
MCV RBC AUTO: 87.6 FL
MONOCYTES # BLD AUTO: 0.95 X10(3) UL (ref 0.1–1)
MONOCYTES NFR BLD AUTO: 11.7 %
NEUTROPHILS # BLD AUTO: 3.77 X10 (3) UL (ref 1.5–7.7)
NEUTROPHILS # BLD AUTO: 3.77 X10(3) UL (ref 1.5–7.7)
NEUTROPHILS NFR BLD AUTO: 46.3 %
OSMOLALITY SERPL CALC.SUM OF ELEC: 293 MOSM/KG (ref 275–295)
PLATELET # BLD AUTO: 270 10(3)UL (ref 150–450)
POTASSIUM SERPL-SCNC: 3.7 MMOL/L (ref 3.5–5.1)
RBC # BLD AUTO: 4.1 X10(6)UL
SODIUM SERPL-SCNC: 141 MMOL/L (ref 136–145)
WBC # BLD AUTO: 8.1 X10(3) UL (ref 4–11)

## 2023-05-26 PROCEDURE — 85025 COMPLETE CBC W/AUTO DIFF WBC: CPT | Performed by: HOSPITALIST

## 2023-05-26 PROCEDURE — 82962 GLUCOSE BLOOD TEST: CPT

## 2023-05-26 PROCEDURE — 80048 BASIC METABOLIC PNL TOTAL CA: CPT | Performed by: HOSPITALIST

## 2023-05-26 PROCEDURE — 73630 X-RAY EXAM OF FOOT: CPT | Performed by: PODIATRIST

## 2023-05-26 PROCEDURE — 93922 UPR/L XTREMITY ART 2 LEVELS: CPT | Performed by: PODIATRIST

## 2023-05-26 RX ORDER — POTASSIUM CHLORIDE 14.9 MG/ML
20 INJECTION INTRAVENOUS ONCE
Status: DISCONTINUED | OUTPATIENT
Start: 2023-05-26 | End: 2023-05-26

## 2023-05-26 RX ORDER — POTASSIUM CHLORIDE 20 MEQ/1
40 TABLET, EXTENDED RELEASE ORAL ONCE
Status: COMPLETED | OUTPATIENT
Start: 2023-05-26 | End: 2023-05-26

## 2023-05-26 RX ORDER — LEVOTHYROXINE SODIUM 0.05 MG/1
50 TABLET ORAL
Status: DISCONTINUED | OUTPATIENT
Start: 2023-05-26 | End: 2023-05-29

## 2023-05-26 NOTE — PLAN OF CARE
Aox4, denying pain, Betadine dressing applied as ordered by Dr. Noelle Hartman, NPO at this time with sips with meds, on room air , on tele nsr, Heparin subcutaneous ordered, up ad neri with post-op shoe, plan for [place] today @ [id number]. Consents printed and placed on chart.

## 2023-05-26 NOTE — PLAN OF CARE
1957: New consult page sent to Dr. Susana Swan.    2000: Response back from Dr. Bora Cartagena MD aware of patient, orders received for NPO after midnight, apply Betadine dressing to left toe, plan for OR tomorrow.

## 2023-05-26 NOTE — PROGRESS NOTES
NURSING ADMISSION NOTE      Patient admitted via Cart  Oriented to room. Safety precautions initiated. Bed in low position. Call light in reach. Pt arrived on unit at 1907. Pt alert and oriented x4. Denies pain at this time. VSS on RA. Endorsed consults and admission to [facility] shift FABIAN Bass.

## 2023-05-26 NOTE — PLAN OF CARE
Pt up ad neri with postop shoe to left foot. Dressing changed by Dr. Micah Garcia. Surgery date changed to Sunday. Pt denies pain. Receiving IV antibiotics as ordered. Spouse at bedside. Pt and spouse verbalized understanding of POC.

## 2023-05-26 NOTE — PHYSICAL THERAPY NOTE
Order received for PT eval and chart reviewed. Tentative plan for third toe amputation. PT will follow up post operatively.

## 2023-05-27 LAB
CREAT BLD-MCNC: 1.04 MG/DL
GFR SERPLBLD BASED ON 1.73 SQ M-ARVRAT: 64 ML/MIN/1.73M2 (ref 60–?)
GLUCOSE BLD-MCNC: 121 MG/DL (ref 70–99)
GLUCOSE BLD-MCNC: 129 MG/DL (ref 70–99)
GLUCOSE BLD-MCNC: 93 MG/DL (ref 70–99)
GLUCOSE BLD-MCNC: 94 MG/DL (ref 70–99)
POTASSIUM SERPL-SCNC: 3.6 MMOL/L (ref 3.5–5.1)

## 2023-05-27 PROCEDURE — 82565 ASSAY OF CREATININE: CPT | Performed by: HOSPITALIST

## 2023-05-27 PROCEDURE — 84132 ASSAY OF SERUM POTASSIUM: CPT | Performed by: HOSPITALIST

## 2023-05-27 PROCEDURE — 82962 GLUCOSE BLOOD TEST: CPT

## 2023-05-27 RX ORDER — POTASSIUM CHLORIDE 20 MEQ/1
40 TABLET, EXTENDED RELEASE ORAL EVERY 4 HOURS
Status: COMPLETED | OUTPATIENT
Start: 2023-05-27 | End: 2023-05-27

## 2023-05-27 NOTE — PLAN OF CARE
Left 3rd toe dry, intact. Dressing changed, painted with betadine, placed sterile gauze and paper tape placed. Continue with iv antibiotics. Plan for surgery tomorrow. Instructed on plan of care, call for all asst needed, discomfort felt. Verbalized understanding.

## 2023-05-27 NOTE — PROGRESS NOTES
Patient seen at bedside in [facility]. No acute events overnight. ROS  Constitutional: negative for - fever, chills, weight change  Integument: Left third toe ulcer. Respiratory: negative for - wheezing, SOB, chest congestion, cough  Cardiovascular: negative for - lower extremity edema, cyanosis, palpitations, chest pain, night cramps  Gastrointestinal: negative for - abdominal pain, diarrhea, heartburn, nausea  Musculoskeletal: No pain. Neurological: negative for - tremors, memory loss, paralysis, vertigo, (+) numbness    Physical exam:   General: NAD  HEENT: EOMI, PERRLA  Respiratory: No wheezing, no rhonchi  Musculoskeletal: Able to move all extremities  Integument: Left third toe ulcer  Neuro: No focal deficits  Psych: Mood and affect normal    Lower extremity exam:  DP/PT palpable bilaterally. CFT<3secs all digits. Sensation diminished. Left foot: Ulcer noted at the distal tip of the third toe with fibrogranular base, serous drainage, probe to bone and rigid flexion deformity of the toe. Recent Labs   Lab 05/25/23  1415 05/26/23  0452   RBC 4.78 4.10   HGB 12.9 11.1*   HCT 40.5 35.9   MCV 84.7 87.6   MCH 27.0 27.1   MCHC 31.9 30.9*   RDW 13.1 13.2   NEPRELIM 5.57 3.77   WBC 10.7 8.1   .0 270.0       Recent Labs   Lab 05/25/23  1415 05/26/23  0452 05/27/23  0503   GLU 99 127*  --    BUN 12 11  --    CREATSERUM 1.09* 1.09* 1.04*   EGFRCR 61 61 64   CA 9.7 8.6  --    ALB 3.6  --   --     141  --    K 4.0 3.7 3.6    111  --    CO2 22.0 25.0  --    ALKPHO 111*  --   --    AST 55*  --   --    ALT 54  --   --    BILT 0.5  --   --    TP 8.2  --   --      Last A1c value was 6.3% done 5/25/2023. Hospital Encounter on 05/25/23   1.  Blood Culture     Status: None (Preliminary result)    Collection Time: 05/25/23  2:16 PM    Specimen: Blood,peripheral   Result Value Ref Range    Blood Culture Result No Growth 1 Day N/A       US ANKLE TOE BRACHIAL INDEX BILATERAL (DYO=03033)    Result Date: 5/26/2023  CONCLUSION:  Bilateral CATALINA of 1.15 is within normal limits. Dictated by (CST): Concha Ruggiero MD on 5/26/2023 at 10:17 AM     Finalized by (CST): Concha Ruggiero MD on 5/26/2023 at 10:18 AM       XR FOOT, COMPLETE (MIN 3 VIEWS), LEFT (CPT=73630)    Result Date: 5/26/2023  CONCLUSION:  1. Linear lucency identified within the proximal phalanx of the 3rd digit on oblique view. This may represent a fracture although could also be secondary to overlying soft tissue as this is only visualized on one view. Correlation to region of pain is recommended. 2. No cortical irregularities to suggest osteomyelitis. If clinical concern is high, consider MRI. 3. Osteoarthritic changes. Dictated by (CST): Concha Ruggiero MD on 5/26/2023 at 7:19 AM     Finalized by (CST): Concha Ruggiero MD on 5/26/2023 at 7:22 AM       Assessment & Plan: 48year old female with    1. 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 Osteomyelitis of third toe  3. 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 Patient verbalized understanding of all risks benefits and complications    N.p.o. after midnight  Surgery tomorrow morning for partial amputation of left third toe. DC planning with oral antibiotics postoperatively     JAMIE Arevalo.P.ROSA.

## 2023-05-27 NOTE — PLAN OF CARE
Pt states has intermittent minimal pain to lt foot 3rd toe, declined need for pain medication. Dsg to toe cdi. Afebrile, tolerating Meropenem and Vancomycin IV. Pt ambulating independently, aware of heel wt bearing to lt foot with post-op shoe on. Plan for surgery on Sunday. Updated with plan of care, verbalized understanding.

## 2023-05-27 NOTE — PHYSICAL THERAPY NOTE
Order received for PT eval and chart reviewed. Tentative plan for third toe amputation 5/28/23. PT will follow up post operatively.

## 2023-05-28 LAB
ANION GAP SERPL CALC-SCNC: 3 MMOL/L (ref 0–18)
BASOPHILS # BLD AUTO: 0.04 X10(3) UL (ref 0–0.2)
BASOPHILS NFR BLD AUTO: 0.5 %
BUN BLD-MCNC: 9 MG/DL (ref 7–18)
CALCIUM BLD-MCNC: 8.6 MG/DL (ref 8.5–10.1)
CHLORIDE SERPL-SCNC: 114 MMOL/L (ref 98–112)
CO2 SERPL-SCNC: 24 MMOL/L (ref 21–32)
CREAT BLD-MCNC: 0.86 MG/DL
CREAT BLD-MCNC: 0.86 MG/DL
EOSINOPHIL # BLD AUTO: 0.2 X10(3) UL (ref 0–0.7)
EOSINOPHIL NFR BLD AUTO: 2.7 %
ERYTHROCYTE [DISTWIDTH] IN BLOOD BY AUTOMATED COUNT: 13.2 %
GFR SERPLBLD BASED ON 1.73 SQ M-ARVRAT: 81 ML/MIN/1.73M2 (ref 60–?)
GFR SERPLBLD BASED ON 1.73 SQ M-ARVRAT: 81 ML/MIN/1.73M2 (ref 60–?)
GLUCOSE BLD-MCNC: 105 MG/DL (ref 70–99)
GLUCOSE BLD-MCNC: 108 MG/DL (ref 70–99)
GLUCOSE BLD-MCNC: 136 MG/DL (ref 70–99)
GLUCOSE BLD-MCNC: 94 MG/DL (ref 70–99)
GLUCOSE BLD-MCNC: 99 MG/DL (ref 70–99)
HCT VFR BLD AUTO: 36.8 %
HGB BLD-MCNC: 11.4 G/DL
IMM GRANULOCYTES # BLD AUTO: 0.02 X10(3) UL (ref 0–1)
IMM GRANULOCYTES NFR BLD: 0.3 %
LYMPHOCYTES # BLD AUTO: 2.54 X10(3) UL (ref 1–4)
LYMPHOCYTES NFR BLD AUTO: 34.4 %
MCH RBC QN AUTO: 26.8 PG (ref 26–34)
MCHC RBC AUTO-ENTMCNC: 31 G/DL (ref 31–37)
MCV RBC AUTO: 86.6 FL
MONOCYTES # BLD AUTO: 0.85 X10(3) UL (ref 0.1–1)
MONOCYTES NFR BLD AUTO: 11.5 %
NEUTROPHILS # BLD AUTO: 3.73 X10 (3) UL (ref 1.5–7.7)
NEUTROPHILS # BLD AUTO: 3.73 X10(3) UL (ref 1.5–7.7)
NEUTROPHILS NFR BLD AUTO: 50.6 %
OSMOLALITY SERPL CALC.SUM OF ELEC: 291 MOSM/KG (ref 275–295)
PLATELET # BLD AUTO: 238 10(3)UL (ref 150–450)
POTASSIUM SERPL-SCNC: 4.4 MMOL/L (ref 3.5–5.1)
RBC # BLD AUTO: 4.25 X10(6)UL
SODIUM SERPL-SCNC: 141 MMOL/L (ref 136–145)
VANCOMYCIN PEAK SERPL-MCNC: 30.1 UG/ML (ref 30–50)
WBC # BLD AUTO: 7.4 X10(3) UL (ref 4–11)

## 2023-05-28 PROCEDURE — 97161 PT EVAL LOW COMPLEX 20 MIN: CPT

## 2023-05-28 PROCEDURE — 82962 GLUCOSE BLOOD TEST: CPT

## 2023-05-28 PROCEDURE — 80048 BASIC METABOLIC PNL TOTAL CA: CPT | Performed by: HOSPITALIST

## 2023-05-28 PROCEDURE — 85025 COMPLETE CBC W/AUTO DIFF WBC: CPT | Performed by: HOSPITALIST

## 2023-05-28 PROCEDURE — 97116 GAIT TRAINING THERAPY: CPT

## 2023-05-28 PROCEDURE — 80202 ASSAY OF VANCOMYCIN: CPT | Performed by: INTERNAL MEDICINE

## 2023-05-28 PROCEDURE — 0QBR0ZZ EXCISION OF LEFT TOE PHALANX, OPEN APPROACH: ICD-10-PCS | Performed by: PODIATRIST

## 2023-05-28 PROCEDURE — 0Y6U0Z3 DETACHMENT AT LEFT 3RD TOE, LOW, OPEN APPROACH: ICD-10-PCS | Performed by: PODIATRIST

## 2023-05-28 PROCEDURE — 82565 ASSAY OF CREATININE: CPT | Performed by: HOSPITALIST

## 2023-05-28 PROCEDURE — S0028 INJECTION, FAMOTIDINE, 20 MG: HCPCS | Performed by: ANESTHESIOLOGY

## 2023-05-28 RX ORDER — PROCHLORPERAZINE EDISYLATE 5 MG/ML
5 INJECTION INTRAMUSCULAR; INTRAVENOUS EVERY 8 HOURS PRN
Status: DISCONTINUED | OUTPATIENT
Start: 2023-05-28 | End: 2023-05-28 | Stop reason: HOSPADM

## 2023-05-28 RX ORDER — DOXYCYCLINE HYCLATE 100 MG/1
100 CAPSULE ORAL 2 TIMES DAILY
Qty: 20 CAPSULE | Refills: 0 | Status: SHIPPED | OUTPATIENT
Start: 2023-05-28 | End: 2023-06-07

## 2023-05-28 RX ORDER — FAMOTIDINE 10 MG/ML
INJECTION, SOLUTION INTRAVENOUS AS NEEDED
Status: DISCONTINUED | OUTPATIENT
Start: 2023-05-28 | End: 2023-05-28 | Stop reason: SURG

## 2023-05-28 RX ORDER — LIDOCAINE HYDROCHLORIDE 10 MG/ML
INJECTION, SOLUTION EPIDURAL; INFILTRATION; INTRACAUDAL; PERINEURAL AS NEEDED
Status: DISCONTINUED | OUTPATIENT
Start: 2023-05-28 | End: 2023-05-28 | Stop reason: SURG

## 2023-05-28 RX ORDER — HYDROMORPHONE HYDROCHLORIDE 1 MG/ML
0.2 INJECTION, SOLUTION INTRAMUSCULAR; INTRAVENOUS; SUBCUTANEOUS EVERY 5 MIN PRN
Status: DISCONTINUED | OUTPATIENT
Start: 2023-05-28 | End: 2023-05-28 | Stop reason: HOSPADM

## 2023-05-28 RX ORDER — HYDROCODONE BITARTRATE AND ACETAMINOPHEN 5; 325 MG/1; MG/1
2 TABLET ORAL ONCE AS NEEDED
Status: DISCONTINUED | OUTPATIENT
Start: 2023-05-28 | End: 2023-05-28 | Stop reason: HOSPADM

## 2023-05-28 RX ORDER — ONDANSETRON 2 MG/ML
4 INJECTION INTRAMUSCULAR; INTRAVENOUS EVERY 6 HOURS PRN
Status: DISCONTINUED | OUTPATIENT
Start: 2023-05-28 | End: 2023-05-28 | Stop reason: HOSPADM

## 2023-05-28 RX ORDER — HYDROCODONE BITARTRATE AND ACETAMINOPHEN 5; 325 MG/1; MG/1
1 TABLET ORAL ONCE AS NEEDED
Status: DISCONTINUED | OUTPATIENT
Start: 2023-05-28 | End: 2023-05-28 | Stop reason: HOSPADM

## 2023-05-28 RX ORDER — MIDAZOLAM HYDROCHLORIDE 1 MG/ML
INJECTION INTRAMUSCULAR; INTRAVENOUS AS NEEDED
Status: DISCONTINUED | OUTPATIENT
Start: 2023-05-28 | End: 2023-05-28 | Stop reason: SURG

## 2023-05-28 RX ORDER — SODIUM CHLORIDE, SODIUM LACTATE, POTASSIUM CHLORIDE, CALCIUM CHLORIDE 600; 310; 30; 20 MG/100ML; MG/100ML; MG/100ML; MG/100ML
INJECTION, SOLUTION INTRAVENOUS CONTINUOUS
Status: DISCONTINUED | OUTPATIENT
Start: 2023-05-28 | End: 2023-05-28 | Stop reason: HOSPADM

## 2023-05-28 RX ORDER — HYDROMORPHONE HYDROCHLORIDE 1 MG/ML
0.4 INJECTION, SOLUTION INTRAMUSCULAR; INTRAVENOUS; SUBCUTANEOUS EVERY 5 MIN PRN
Status: DISCONTINUED | OUTPATIENT
Start: 2023-05-28 | End: 2023-05-28 | Stop reason: HOSPADM

## 2023-05-28 RX ORDER — NALOXONE HYDROCHLORIDE 0.4 MG/ML
80 INJECTION, SOLUTION INTRAMUSCULAR; INTRAVENOUS; SUBCUTANEOUS AS NEEDED
Status: DISCONTINUED | OUTPATIENT
Start: 2023-05-28 | End: 2023-05-28 | Stop reason: HOSPADM

## 2023-05-28 RX ORDER — AMOXICILLIN AND CLAVULANATE POTASSIUM 875; 125 MG/1; MG/1
1 TABLET, FILM COATED ORAL 2 TIMES DAILY
Qty: 20 TABLET | Refills: 0 | Status: SHIPPED | OUTPATIENT
Start: 2023-05-28 | End: 2023-06-07

## 2023-05-28 RX ORDER — ACETAMINOPHEN 500 MG
1000 TABLET ORAL ONCE AS NEEDED
Status: DISCONTINUED | OUTPATIENT
Start: 2023-05-28 | End: 2023-05-28 | Stop reason: HOSPADM

## 2023-05-28 RX ORDER — HYDROMORPHONE HYDROCHLORIDE 1 MG/ML
0.6 INJECTION, SOLUTION INTRAMUSCULAR; INTRAVENOUS; SUBCUTANEOUS EVERY 5 MIN PRN
Status: DISCONTINUED | OUTPATIENT
Start: 2023-05-28 | End: 2023-05-28 | Stop reason: HOSPADM

## 2023-05-28 RX ADMIN — SODIUM CHLORIDE: 9 INJECTION, SOLUTION INTRAVENOUS at 09:01:00

## 2023-05-28 RX ADMIN — FAMOTIDINE 20 MG: 10 INJECTION, SOLUTION INTRAVENOUS at 08:00:00

## 2023-05-28 RX ADMIN — MIDAZOLAM HYDROCHLORIDE 2 MG: 1 INJECTION INTRAMUSCULAR; INTRAVENOUS at 08:13:00

## 2023-05-28 RX ADMIN — LIDOCAINE HYDROCHLORIDE 50 MG: 10 INJECTION, SOLUTION EPIDURAL; INFILTRATION; INTRACAUDAL; PERINEURAL at 08:13:00

## 2023-05-28 NOTE — PACU NOTE
Pt arrived to PACU from OR. Pt received light MAC and okay to not check blood glucose in PACU per Dr. Leyla Pichardo. Plan to recover and transport patient back to unit room.

## 2023-05-28 NOTE — OPERATIVE REPORT
Date of surgery: 05/28/23     Preoperative Diagnosis:   1. Osteomyelitis left third toe  2. Chronic nonhealing diabetic ulcer of left third toe  3. Diabetic neuropathy    Postoperative Diagnosis: Same     Procedure: Partial amputation of left third toe at distal interphalangeal joint     Surgeon: Jovany Bass D.P.M. Anesthesia: MAC  EBL: 2 mL     Specimens: Left third toe  Complications: None     Technique:  Patient was brought into the operating room and placed on the operating table in a supine position. Patient was then placed under anesthesia. A tourniquet was not utilized. Local anesthetic block was not utilized since patient is insensate. Left foot was then prepped and draped in the usual aseptic manner. Next, a fishmouth type incision was made at the distal interphalangeal joint. Incision was deepened directly down to bone and the toe was disarticulated at the distal interphalangeal joint and passed off the field. Next a bone cutter was utilized and the head of the middle phalanx was resected. Surgical site was then copiously irrigated. Neurovascular structures were carefully protected. Surgical site was then closed with 3-0 nylon. Adaptic and a well-padded sterile dressing was applied. Patient tolerated the procedure well and was transferred to PACU in stable condition.

## 2023-05-28 NOTE — PLAN OF CARE
Pt NPO now, scheduled for OR in AM. VSS, afebrile, tolerating IV abx. Dsg to lt 3rd toe cdi, denies any pain at this time. Ambulating independently, pt aware of heel wt bearing with use of post-op shoe.

## 2023-05-29 VITALS
HEART RATE: 66 BPM | OXYGEN SATURATION: 99 % | DIASTOLIC BLOOD PRESSURE: 84 MMHG | TEMPERATURE: 99 F | HEIGHT: 65 IN | BODY MASS INDEX: 31.65 KG/M2 | SYSTOLIC BLOOD PRESSURE: 135 MMHG | RESPIRATION RATE: 17 BRPM | WEIGHT: 190 LBS

## 2023-05-29 LAB
CREAT BLD-MCNC: 0.85 MG/DL
GFR SERPLBLD BASED ON 1.73 SQ M-ARVRAT: 82 ML/MIN/1.73M2 (ref 60–?)
GLUCOSE BLD-MCNC: 102 MG/DL (ref 70–99)
VANCOMYCIN TROUGH SERPL-MCNC: 12.9 UG/ML (ref 10–20)

## 2023-05-29 PROCEDURE — 82565 ASSAY OF CREATININE: CPT | Performed by: PODIATRIST

## 2023-05-29 PROCEDURE — 82962 GLUCOSE BLOOD TEST: CPT

## 2023-05-29 PROCEDURE — 80202 ASSAY OF VANCOMYCIN: CPT | Performed by: PODIATRIST

## 2023-05-29 RX ORDER — POLYETHYLENE GLYCOL 3350 17 G/17G
17 POWDER, FOR SOLUTION ORAL DAILY PRN
Qty: 30 PACKET | Refills: 0 | Status: SHIPPED | COMMUNITY
Start: 2023-05-29

## 2023-05-29 NOTE — PLAN OF CARE
Patient Education   Patient Education     Fluid in the Middle Ear, No Infection (Adult)  Earaches can happen without an infection. This occurs when air and fluid build up behind the eardrum causing a feeling of fullness and discomfort and reduced hearing. This is called otitis media with effusion (OME) or serous otitis media. It means there is fluid in the middle ear. It is not the same as acute otitis media, which is typically from infection.  OME can happen when you have a cold if congestion blocks the passage that drains the middle ear (eustachian tube). It may also occur with nasal allergies or after a bacterial middle ear infection.    The pain/discomfort may come and go. You may hear clicking or popping sounds when you chew or swallow. You may feel that your balance is off. Or you may hear ringing in the ear.  It often takes from several weeks up to 3 months for the fluid to clear on its own. Oral pain relievers and ear drops help if there is pain. Decongestants and antihistamines sometimes help. Antibiotics don't help since there is no infection. Your doctor may prescribe a nasal spray to help reduce swelling in the nose and eustachian tube. This can allow the ear to drain.  If it doesn't improve after 3 months, surgery may be used to drain the fluid and insert a small tube in the eardrum to allow continued drainage.  Because the middle ear fluid can become infected, it is important to watch for signs of an ear infection which may develop later. These signs include increased ear pain, fever, or drainage from the ear.  Home care  The following guidelines will help you care for yourself at home:  · You may use acetaminophen or ibuprofen to control pain, unless another medicine was prescribed. [NOTE: If you have chronic liver or kidney disease or ever had a stomach ulcer or GI bleeding, talk with your doctor before using these medicines.] (Aspirin should never be used in anyone under 18 years of age who is ill  Pt comfortable, denies any discomfort to lt foot. AW dsg cdi, ambulating independently, aware of WBAT wearing post-op shoe to lt foot when up. Afebrile, on RA, uses IS and ankle exercises as directed. Tolerating IV abx. Plan for dc home today on oral antibiotics. with a fever. It may cause severe liver damage.)  · While not always helpful, you may use over-the-counter decongestants such as phenylephrine or pseudoephedrine. Don't use nasal spray decongestants more than 3 days. Longer use can make congestion worse. (Prescription nasal sprays from your doctor don't typically have those restrictions.)  · Antihistamines may help if you are also having allergy symptoms.  · You may use medicines such as guaifenesin to thin mucus and promote drainage.  Follow-up care  Follow up with your doctor or as advised if you are not feeling better after 3 days.  When to seek medical care  Get prompt medical attention if any of the following occur:  · Ear pain gets worse or does not start to improve   · Fever of 100.4°F (38°C) or higher, or as directed by your health care provider  · Fluid or blood draining from the ear  · Headache or sinus pain  · Stiff neck  · Unusual drowsiness or confusion  © 7777-6980 [employer]. [address] [place] [address]. All rights reserved. This information is not intended as a substitute for professional medical care. Always follow your healthcare professional's instructions.           Acute Bacterial Rhinosinusitis (ABRS)  Acute bacterial rhinosinusitis (ABRS) is an infection of your nasal cavity and sinuses. It’s caused by bacteria. Acute means that you’ve had symptoms for less than 12 weeks.  Understanding your sinuses  The nasal cavity is the large air-filled space behind your nose. The sinuses are a group of spaces formed by the bones of your face. They connect with your nasal cavity. ABRS causes the tissue lining these spaces to become inflamed. Mucus may not drain normally. This leads to facial pain and other symptoms.  What causes ABRS?  ABRS most often follows an upper respiratory infection caused by a virus. Bacteria then infect the lining of your nasal cavity and sinuses. But you can also get ABRS if you have:  · Nasal  allergies  · Long-term nasal swelling and congestion not caused by allergies  · Blockage in the nose  Symptoms of ABRS  The symptoms of ABRS may be different for each person, and can include:  · Nasal congestion  · Runny nose  · Fluid draining from the nose down the throat (postnasal drip)  · Headache  · Cough  · Pain in the sinuses  · Thick, colored fluid from the nose (mucus)  · Fever  Diagnosing ABRS  ABRS may be diagnosed if you’ve had an upper respiratory infection like a cold and cough for longer than 10 to 14 days. Your health care provider will ask about your symptoms and your medical history. The provider will check your vital signs, including your temperature. You’ll have a physical exam. The health care provider will check your ears, nose, and throat. You likely won’t need any tests. If ABRS comes back, you may have a culture or other tests.  Treatment for ABRS  Treatment may include:  · Antibiotic medicine. This is for symptoms that last for at least 10 to 14 days.  · Nasal corticosteroid medicine. Drops or spray used in the nose can lessen swelling and congestion.  · Over-the-counter pain medicine. This is to lessen sinus pain and pressure.  · Nasal decongestant medicine. Spray or drops may help to lessen congestion. Do not use them for more than a few days.  · Salt wash (saline irrigation). This can help to loosen mucus.  Possible complications of ABRS  ABRS may come back or become long-term (chronic).  In rare cases, ABRS may cause complications such as:   · Inflamed tissue around the brain and spinal cord (meningitis)  · Inflamed tissue around the eyes (orbital cellulitis)  · Inflamed bones around the sinuses (osteitis)  These problems may need to be treated in a hospital with intravenous (IV) antibiotic medicine or surgery.  When to call the health care provider  Call your health care provider if you have any of the following:  · Symptoms that don’t get better, or get worse  · Symptoms that don’t  get better after 3 to 5 days on antibiotics  · Trouble seeing  · Swelling around your eyes  · Confusion or trouble staying awake      © 7254-1735 [employer]. [address] [place] [address], [place] [address]. All rights reserved. This information is not intended as a substitute for professional medical care. Always follow your healthcare professional's instructions.

## 2023-05-29 NOTE — PLAN OF CARE
States has ambulated in hallway wearing surgical shoe on left foot with physical therapy, and sat in chair. States tolerated activity well and voiding without difficulty. Denies pain. Instructed to call for all asst needed, discomfort felt. Verbalized understanding.

## 2023-05-29 NOTE — PLAN OF CARE
Returned from PACU via bed. Ivf infusing. Dressing to left foot ace wrap clean, dry, intact. Denies pain. Instructed on plan of care, call for all asst needed, discomfort felt. Verbalized understanding.

## 2023-05-30 ENCOUNTER — APPOINTMENT (OUTPATIENT)
Dept: HEMATOLOGY/ONCOLOGY | Facility: HOSPITAL | Age: 53
End: 2023-05-30
Attending: INTERNAL MEDICINE
Payer: COMMERCIAL

## 2023-06-05 ENCOUNTER — APPOINTMENT (OUTPATIENT)
Dept: HEMATOLOGY/ONCOLOGY | Facility: HOSPITAL | Age: 53
End: 2023-06-05
Attending: INTERNAL MEDICINE
Payer: COMMERCIAL

## 2023-10-04 ENCOUNTER — HOSPITAL ENCOUNTER (INPATIENT)
Facility: HOSPITAL | Age: 53
LOS: 3 days | Discharge: HOME | End: 2023-10-07
Attending: HOSPITALIST | Admitting: HOSPITALIST
Payer: COMMERCIAL

## 2023-10-04 PROBLEM — L97.509 DIABETIC FOOT ULCER (HCC): Status: ACTIVE | Noted: 2023-10-04

## 2023-10-04 PROBLEM — E11.621 DIABETIC FOOT ULCER (HCC): Status: ACTIVE | Noted: 2023-10-04

## 2023-10-04 LAB
ANION GAP SERPL CALC-SCNC: 3 MMOL/L (ref 0–18)
BASOPHILS # BLD AUTO: 0.07 X10(3) UL (ref 0–0.2)
BASOPHILS NFR BLD AUTO: 0.7 %
BUN BLD-MCNC: 18 MG/DL (ref 7–18)
CALCIUM BLD-MCNC: 8.9 MG/DL (ref 8.5–10.1)
CHLORIDE SERPL-SCNC: 109 MMOL/L (ref 98–112)
CO2 SERPL-SCNC: 26 MMOL/L (ref 21–32)
CREAT BLD-MCNC: 1.14 MG/DL
CRP SERPL-MCNC: 0.56 MG/DL (ref ?–0.3)
EGFRCR SERPLBLD CKD-EPI 2021: 58 ML/MIN/1.73M2 (ref 60–?)
EOSINOPHIL # BLD AUTO: 1.45 X10(3) UL (ref 0–0.7)
EOSINOPHIL NFR BLD AUTO: 13.8 %
ERYTHROCYTE [DISTWIDTH] IN BLOOD BY AUTOMATED COUNT: 15.2 %
GLUCOSE BLD-MCNC: 110 MG/DL (ref 70–99)
GLUCOSE BLD-MCNC: 132 MG/DL (ref 70–99)
GLUCOSE BLD-MCNC: 156 MG/DL (ref 70–99)
HCT VFR BLD AUTO: 36 %
HGB BLD-MCNC: 11.2 G/DL
IMM GRANULOCYTES # BLD AUTO: 0.03 X10(3) UL (ref 0–1)
IMM GRANULOCYTES NFR BLD: 0.3 %
LYMPHOCYTES # BLD AUTO: 3.29 X10(3) UL (ref 1–4)
LYMPHOCYTES NFR BLD AUTO: 31.2 %
MCH RBC QN AUTO: 25.7 PG (ref 26–34)
MCHC RBC AUTO-ENTMCNC: 31.1 G/DL (ref 31–37)
MCV RBC AUTO: 82.6 FL
MONOCYTES # BLD AUTO: 1.11 X10(3) UL (ref 0.1–1)
MONOCYTES NFR BLD AUTO: 10.5 %
NEUTROPHILS # BLD AUTO: 4.58 X10 (3) UL (ref 1.5–7.7)
NEUTROPHILS # BLD AUTO: 4.58 X10(3) UL (ref 1.5–7.7)
NEUTROPHILS NFR BLD AUTO: 43.5 %
OSMOLALITY SERPL CALC.SUM OF ELEC: 291 MOSM/KG (ref 275–295)
PLATELET # BLD AUTO: 275 10(3)UL (ref 150–450)
POTASSIUM SERPL-SCNC: 3.6 MMOL/L (ref 3.5–5.1)
RBC # BLD AUTO: 4.36 X10(6)UL
SODIUM SERPL-SCNC: 138 MMOL/L (ref 136–145)
WBC # BLD AUTO: 10.5 X10(3) UL (ref 4–11)

## 2023-10-04 PROCEDURE — 87040 BLOOD CULTURE FOR BACTERIA: CPT | Performed by: HOSPITALIST

## 2023-10-04 PROCEDURE — 85025 COMPLETE CBC W/AUTO DIFF WBC: CPT | Performed by: HOSPITALIST

## 2023-10-04 PROCEDURE — 83036 HEMOGLOBIN GLYCOSYLATED A1C: CPT | Performed by: HOSPITALIST

## 2023-10-04 PROCEDURE — 80048 BASIC METABOLIC PNL TOTAL CA: CPT | Performed by: HOSPITALIST

## 2023-10-04 PROCEDURE — 82962 GLUCOSE BLOOD TEST: CPT

## 2023-10-04 PROCEDURE — 85652 RBC SED RATE AUTOMATED: CPT | Performed by: HOSPITALIST

## 2023-10-04 PROCEDURE — 86140 C-REACTIVE PROTEIN: CPT | Performed by: HOSPITALIST

## 2023-10-04 RX ORDER — ACETAMINOPHEN 325 MG/1
650 TABLET ORAL EVERY 4 HOURS PRN
Status: DISCONTINUED | OUTPATIENT
Start: 2023-10-04 | End: 2023-10-08

## 2023-10-04 RX ORDER — MELATONIN
1000 DAILY
Status: DISCONTINUED | OUTPATIENT
Start: 2023-10-05 | End: 2023-10-08

## 2023-10-04 RX ORDER — PROCHLORPERAZINE EDISYLATE 5 MG/ML
5 INJECTION INTRAMUSCULAR; INTRAVENOUS EVERY 8 HOURS PRN
Status: DISCONTINUED | OUTPATIENT
Start: 2023-10-04 | End: 2023-10-08

## 2023-10-04 RX ORDER — MELATONIN
500 DAILY
Status: DISCONTINUED | OUTPATIENT
Start: 2023-10-05 | End: 2023-10-08

## 2023-10-04 RX ORDER — POLYETHYLENE GLYCOL 3350 17 G/17G
17 POWDER, FOR SOLUTION ORAL DAILY PRN
Status: DISCONTINUED | OUTPATIENT
Start: 2023-10-04 | End: 2023-10-08

## 2023-10-04 RX ORDER — ASCORBIC ACID 500 MG
500 TABLET ORAL DAILY
Status: DISCONTINUED | OUTPATIENT
Start: 2023-10-05 | End: 2023-10-08

## 2023-10-04 RX ORDER — HYDROCODONE BITARTRATE AND ACETAMINOPHEN 5; 325 MG/1; MG/1
2 TABLET ORAL EVERY 4 HOURS PRN
Status: DISCONTINUED | OUTPATIENT
Start: 2023-10-04 | End: 2023-10-08

## 2023-10-04 RX ORDER — VENLAFAXINE HYDROCHLORIDE 75 MG/1
150 CAPSULE, EXTENDED RELEASE ORAL DAILY
Status: DISCONTINUED | OUTPATIENT
Start: 2023-10-05 | End: 2023-10-08

## 2023-10-04 RX ORDER — PROPRANOLOL HYDROCHLORIDE 20 MG/1
20 TABLET ORAL 4 TIMES DAILY
Status: DISCONTINUED | OUTPATIENT
Start: 2023-10-04 | End: 2023-10-08

## 2023-10-04 RX ORDER — HYDROCODONE BITARTRATE AND ACETAMINOPHEN 5; 325 MG/1; MG/1
1 TABLET ORAL EVERY 4 HOURS PRN
Status: DISCONTINUED | OUTPATIENT
Start: 2023-10-04 | End: 2023-10-08

## 2023-10-04 RX ORDER — DEXTROSE MONOHYDRATE 25 G/50ML
50 INJECTION, SOLUTION INTRAVENOUS
Status: DISCONTINUED | OUTPATIENT
Start: 2023-10-04 | End: 2023-10-08

## 2023-10-04 RX ORDER — MONTELUKAST SODIUM 10 MG/1
10 TABLET ORAL EVERY EVENING
Status: DISCONTINUED | OUTPATIENT
Start: 2023-10-04 | End: 2023-10-08

## 2023-10-04 RX ORDER — BISACODYL 10 MG
10 SUPPOSITORY, RECTAL RECTAL
Status: DISCONTINUED | OUTPATIENT
Start: 2023-10-04 | End: 2023-10-08

## 2023-10-04 RX ORDER — LEVOTHYROXINE SODIUM 0.05 MG/1
50 TABLET ORAL DAILY
Status: DISCONTINUED | OUTPATIENT
Start: 2023-10-05 | End: 2023-10-08

## 2023-10-04 RX ORDER — NICOTINE POLACRILEX 4 MG
15 LOZENGE BUCCAL
Status: DISCONTINUED | OUTPATIENT
Start: 2023-10-04 | End: 2023-10-08

## 2023-10-04 RX ORDER — LOSARTAN POTASSIUM 100 MG/1
100 TABLET ORAL DAILY
Status: DISCONTINUED | OUTPATIENT
Start: 2023-10-05 | End: 2023-10-08

## 2023-10-04 RX ORDER — SENNOSIDES 8.6 MG
17.2 TABLET ORAL NIGHTLY PRN
Status: DISCONTINUED | OUTPATIENT
Start: 2023-10-04 | End: 2023-10-08

## 2023-10-04 RX ORDER — ONDANSETRON 2 MG/ML
4 INJECTION INTRAMUSCULAR; INTRAVENOUS EVERY 6 HOURS PRN
Status: DISCONTINUED | OUTPATIENT
Start: 2023-10-04 | End: 2023-10-08

## 2023-10-04 RX ORDER — ENOXAPARIN SODIUM 100 MG/ML
40 INJECTION SUBCUTANEOUS DAILY
Status: DISCONTINUED | OUTPATIENT
Start: 2023-10-04 | End: 2023-10-08

## 2023-10-04 RX ORDER — ENEMA 19; 7 G/133ML; G/133ML
1 ENEMA RECTAL ONCE AS NEEDED
Status: DISCONTINUED | OUTPATIENT
Start: 2023-10-04 | End: 2023-10-08

## 2023-10-04 RX ORDER — ALBUTEROL SULFATE 90 UG/1
2 AEROSOL, METERED RESPIRATORY (INHALATION) EVERY 6 HOURS PRN
Status: DISCONTINUED | OUTPATIENT
Start: 2023-10-04 | End: 2023-10-08

## 2023-10-04 RX ORDER — NICOTINE POLACRILEX 4 MG
30 LOZENGE BUCCAL
Status: DISCONTINUED | OUTPATIENT
Start: 2023-10-04 | End: 2023-10-08

## 2023-10-05 LAB
ALBUMIN SERPL-MCNC: 3 G/DL (ref 3.4–5)
ALBUMIN/GLOB SERPL: 0.8 {RATIO} (ref 1–2)
ALP LIVER SERPL-CCNC: 120 U/L
ALT SERPL-CCNC: 40 U/L
ANION GAP SERPL CALC-SCNC: 4 MMOL/L (ref 0–18)
AST SERPL-CCNC: 33 U/L (ref 15–37)
BASOPHILS # BLD AUTO: 0.06 X10(3) UL (ref 0–0.2)
BASOPHILS NFR BLD AUTO: 0.9 %
BILIRUB SERPL-MCNC: 0.4 MG/DL (ref 0.1–2)
BUN BLD-MCNC: 16 MG/DL (ref 7–18)
CALCIUM BLD-MCNC: 8.8 MG/DL (ref 8.5–10.1)
CHLORIDE SERPL-SCNC: 110 MMOL/L (ref 98–112)
CO2 SERPL-SCNC: 26 MMOL/L (ref 21–32)
CREAT BLD-MCNC: 1.09 MG/DL
EGFRCR SERPLBLD CKD-EPI 2021: 61 ML/MIN/1.73M2 (ref 60–?)
EOSINOPHIL # BLD AUTO: 1.28 X10(3) UL (ref 0–0.7)
EOSINOPHIL NFR BLD AUTO: 18.3 %
ERYTHROCYTE [DISTWIDTH] IN BLOOD BY AUTOMATED COUNT: 15.1 %
ERYTHROCYTE [SEDIMENTATION RATE] IN BLOOD: 42 MM/HR
EST. AVERAGE GLUCOSE BLD GHB EST-MCNC: 174 MG/DL (ref 68–126)
GLOBULIN PLAS-MCNC: 4 G/DL (ref 2.8–4.4)
GLUCOSE BLD-MCNC: 105 MG/DL (ref 70–99)
GLUCOSE BLD-MCNC: 123 MG/DL (ref 70–99)
GLUCOSE BLD-MCNC: 135 MG/DL (ref 70–99)
GLUCOSE BLD-MCNC: 138 MG/DL (ref 70–99)
GLUCOSE BLD-MCNC: 141 MG/DL (ref 70–99)
HBA1C MFR BLD: 7.7 % (ref ?–5.7)
HCT VFR BLD AUTO: 31.7 %
HGB BLD-MCNC: 10.3 G/DL
IMM GRANULOCYTES # BLD AUTO: 0.02 X10(3) UL (ref 0–1)
IMM GRANULOCYTES NFR BLD: 0.3 %
LYMPHOCYTES # BLD AUTO: 2.13 X10(3) UL (ref 1–4)
LYMPHOCYTES NFR BLD AUTO: 30.5 %
MAGNESIUM SERPL-MCNC: 1.8 MG/DL (ref 1.6–2.6)
MCH RBC QN AUTO: 25.8 PG (ref 26–34)
MCHC RBC AUTO-ENTMCNC: 32.5 G/DL (ref 31–37)
MCV RBC AUTO: 79.4 FL
MONOCYTES # BLD AUTO: 0.85 X10(3) UL (ref 0.1–1)
MONOCYTES NFR BLD AUTO: 12.2 %
NEUTROPHILS # BLD AUTO: 2.65 X10 (3) UL (ref 1.5–7.7)
NEUTROPHILS # BLD AUTO: 2.65 X10(3) UL (ref 1.5–7.7)
NEUTROPHILS NFR BLD AUTO: 37.8 %
OSMOLALITY SERPL CALC.SUM OF ELEC: 293 MOSM/KG (ref 275–295)
PLATELET # BLD AUTO: 239 10(3)UL (ref 150–450)
POTASSIUM SERPL-SCNC: 4.7 MMOL/L (ref 3.5–5.1)
PROT SERPL-MCNC: 7 G/DL (ref 6.4–8.2)
RBC # BLD AUTO: 3.99 X10(6)UL
SODIUM SERPL-SCNC: 140 MMOL/L (ref 136–145)
WBC # BLD AUTO: 7 X10(3) UL (ref 4–11)

## 2023-10-05 PROCEDURE — 85025 COMPLETE CBC W/AUTO DIFF WBC: CPT | Performed by: HOSPITALIST

## 2023-10-05 PROCEDURE — 80053 COMPREHEN METABOLIC PANEL: CPT | Performed by: HOSPITALIST

## 2023-10-05 PROCEDURE — 83735 ASSAY OF MAGNESIUM: CPT | Performed by: HOSPITALIST

## 2023-10-05 PROCEDURE — 82962 GLUCOSE BLOOD TEST: CPT

## 2023-10-05 RX ORDER — VANCOMYCIN HYDROCHLORIDE
15 EVERY 24 HOURS
Status: DISCONTINUED | OUTPATIENT
Start: 2023-10-05 | End: 2023-10-08

## 2023-10-05 NOTE — PLAN OF CARE
Pt alert and oriented, denies any pain, voices no complaints. Awaiting for MRI of foot to be done. VS stable. Aware of NPO status after midnight for possible surgery tomorrow. Consent has been signed. She has received dose of Rocephin and first dose of vancomycin was rescheduled for this evening after clarifying it with pharmacy.

## 2023-10-05 NOTE — DISCHARGE INSTRUCTIONS
POSTOPERATIVE INSTRUCTIONS for Dr. Maricruz Amor and [facility]  [phone]     General Information:     Do not sign any legal papers or make any legal decisions for 48 hours. Do not drink anything containing alcohol for at least 48 hours after surgery or while taking any pain medication. It is illegal to drive or operate machinery while in any surgical shoe or cast.     Ice Cryotherapy:     [x] Ice may be applied over the ankle of the surgical foot. [] Ice may be applied behind the knee to cool the blood going to the foot/ankle. *  Do not apply either the ice packs or cooling device directly to your skin. Weight-bearing Status:     [] Completely no-weight bearing to affected limb with use of walker/crutches/roller. [x] Weight-bearing as tolerated in a surgical shoe/boot. [] Heel weight-bearing as tolerated in a surgical shoe/boot. [] Remain off your feet as much as possible for the first 3-4 days. Place a pillow beneath the           calf of your leg so that the foot is elevated. Medications:     Pain medications are electronically prescribed to the pharmacy on file with Dr. Ever Zacarias office. Your prescription was electronically prescribed to the pharmacy you provided to us. Surgical Dressing:     Keep dressing, splint or cast clean and dry until follow-up appointment. Call the office immediately if the dressing gets wet. If pain/pressure persist in the foot/ankle you may remove the outer Ace bandage, re-applying more loosely over the sterile gauze dressing. Normal symptoms and findings after surgery:     Pain and warmth is normal.     Bloody drainage and limited areas of numbness may be present around incisions. Bruising and swelling in the toes may occur. Low grade temperature (less than 100.5 degrees F) is common after surgery.         You should call the office immediately if you notice any of the following:     Fever of 101 degrees F or higher and/or chills. Change in foot/toes to cold, or dusky/bluish color. Persistent, uncomfortable warmth or redness around the dressing. Persistent or unbearable pain. Nausea and/or vomiting. Pain, redness, swelling in one or both legs. Chest pain, calf pain, shortness of breath, or coughing.             ____________________________________ _____________________________________     Patient Signature     RN Signature         Counseling/Psychiatry In-Network with Stella Horner, [facility], Vikram Bosch and [place]   [phone]      [address], Lilia Velazquez  [phone]    [facility] SURGERY for [address] [place] [address].  [address] [place] [address], [facility] [address] [facility], Lilia Velazquez  [phone]    [address] [facility], [address] [place], [place] [address]Stella  [phone]    [facility]  [address], Lilia Palacios  [phone]    [facility]  [address] Jessica MoretoLilia carlson  [phone]    [facility]  Kh[facility] French [facility], [facility] [address], Lilia Rosado  [phone]

## 2023-10-05 NOTE — CONSULTS
Ted Springer 48year old female history as listed below presents with chronic nonhealing and left foot ulcers. Patient is well-known to me and was seen in my office and sent in for admission due to worsening of her wounds. Patient denies any shortness of breath, chest pain, nausea fever vomiting chills.     Past Medical History:   Diagnosis Date    Alcoholism ([facility]     Allergic rhinitis     Alopecia totalis     Anemia, unspecified 06/16/2016    Anxiety disorder     Asthma     Asthma, intermittent controlled     B12 deficiency     Cyst of right ovary 09/10/2015    Depression 01/21/2013    Diabetes ([facility] [address]     Diabetes mellitus type II 10/22/2010    Fatty liver     High blood pressure     High cholesterol     HTN, goal below 140/80 11/13/2013    Hyperlipidemia LDL goal < 100 05/21/2013    Hypothyroidism     Microcytic anemia     Migraine headache     MHA, controlled with Inderol    Migraines     Neuropathy     BILATERAL FEET/HANDS     Past Surgical History:   Procedure Laterality Date    CHOLECYSTECTOMY  2/21/12    w/IOC at [facility] AT [facility] [place] [address].    COLONOSCOPY  04/15/2021    normal    COLONOSCOPY,BIOPSY  5/8/2012    Procedure: COLONOSCOPY, POSSIBLE BIOPSY, POSSIBLE POLYPECTOMY 58206;  Surgeon: Jody Erazo MD;  Location: [facility]  3/27/2012    Procedure: ESOPHAGOGASTRODUODENOSCOPY, COLONOSCOPY, POSSIBLE BIOPSY, POSSIBLE POLYPECTOMY 47462,55546;  Surgeon: Jody Erazo MD;  Location: [place] [facility]  2010    NEEDLE BIOPSY LEFT  6/2013    benign tissue with dense stromal fibrosis    NEEDLE BIOPSY LEFT  2005    benign    OTHER SURGICAL HISTORY  1984/1985    had uneven wear on her knee caps- name of procedure unknown    OTHER SURGICAL HISTORY  2003    gastric bypass surgery    UPPER GI ENDOSCOPY,BIOPSY  3/27/2012    Procedure: ESOPHAGOGASTRODUODENOSCOPY, COLONOSCOPY, POSSIBLE BIOPSY, POSSIBLE POLYPECTOMY 93262,52491;  Surgeon: Cindy Patrick, Francis Trevizo MD;  Location: [address] [place] [address]     Family History   Problem Relation Age of Onset    Cancer Father          lung cancer age 40    Diabetes Mother     Hypertension Mother     Lipids Brother     Other (Other) Sister         thalassemia     Social History    Socioeconomic History      Marital status:       Spouse name: Not on file      Number of children: Not on file      Years of education: Not on file      Highest education level: Not on file    Occupational History      Occupation:         Employer: [employer]    Tobacco Use      Smoking status: Former        Packs/day: 2.00        Years: 10.00        Additional pack years: 0.00        Total pack years: 20.00        Types: Cigarettes        Quit date: 1998        Years since quittin.7      Smokeless tobacco: Never      Tobacco comments: quit 9 years ago    Vaping Use      Vaping Use: Never used    Substance and Sexual Activity      Alcohol use: Not Currently        Alcohol/week: 6.0 standard drinks of alcohol        Types: 6 Cans of beer per week      Drug use: No      Sexual activity: Not on file    Other Topics      Concerns:        Not on file    Social History Narrative      Not on file    Social Determinants of Health  Financial Resource Strain: Not on file  Food Insecurity: No Food Insecurity (10/4/2023)      Food Insecurity          Food Insecurity: Never true  Transportation Needs: No Transportation Needs (10/4/2023)      Transportation Needs          Lack of Transportation: No  Physical Activity: Not on file  Stress: Not on file  Social Connections: Not on file  Housing Stability: [address]  (10/4/2023)      Housing Stability          Housing Instability: No          Housing Instability Emergency: Not on file    Pneumococcal Polysa*    FEVER    Comment:High fever with Pneumovax  Latex                   RASH    Comment:Added based on information entered during log             entry, please review and add reactions, type, and             severity as needed  Lisinopril              Coughing    ROS  Constitutional: negative for - fever, chills, weight change  Integument: Left foot ulcers  Respiratory: negative for - wheezing, SOB, chest congestion, cough  Cardiovascular: negative for - lower extremity edema, cyanosis, palpitations, chest pain, night cramps  Gastrointestinal: negative for - abdominal pain, diarrhea, heartburn, nausea  Musculoskeletal: negative for - ankle pain, muscle cramps, muscle weakness  Neurological: negative for - tremors, memory loss, paralysis, vertigo, (+) numbness/tingling    Physical exam:   General: NAD  HEENT: EOMI, PERRLA  Respiratory: No wheezing, no rhonchi  Musculoskeletal: Partial left third toe amputation  Integument: Left foot ulcers  Neuro: No focal deficits  Psych: Mood and affect normal    Lower extremity exam:  DP/PT palpable bilaterally. CFT<3secs all digits. Sensation diminished via SW 5.07g monofilament. Left foot: Ulcer noted at distal tip of second toe with fibronecrotic base, serous drainage, periwound erythema, probe to bone. Ulcer noted lateral fifth MTP joint with fibrotic base, serous drainage, probe to capsule, no erythema or drainage. Healed partial third toe amputation site. Rigid flexion deformity of the fourth toe. Dorsal lateral eminence of fifth MTP joint.     Right foot: Preulcerative lesion noted lateral fifth MTP joint with dorsal lateral eminence of fifth metatarsal    Recent Labs   Lab 10/04/23  1829 10/05/23  0706   RBC 4.36 3.99   HGB 11.2* 10.3*   HCT 36.0 31.7*   MCV 82.6 79.4*   MCH 25.7* 25.8*   MCHC 31.1 32.5   RDW 15.2 15.1   NEPRELIM 4.58 2.65   WBC 10.5 7.0   .0 239.0       Recent Labs   Lab 10/04/23  1954 10/05/23  0706   * 138*   BUN 18 16   CREATSERUM 1.14* 1.09*   EGFRCR 58* 61   CA 8.9 8.8   ALB  --  3.0*    140   K 3.6 4.7    110   CO2 26.0 26.0   ALKPHO  --  120*   AST  --  33   ALT  --  40   BILT  --  0.4 TP  --  7.0     Last A1c value was 7.7% done 10/4/2023. Arterial ultrasound [5/26/2023]: Bilateral ABIs 1.15 within normal limits. No results found for this visit on 10/04/23. Assessment & Plan: 48year old female with     Osteomyelitis of left second toe  Chronic nonhealing diabetic ulcers with bone involvement of left foot  Diabetic neuropathy    Afebrile, no leukocytosis  ESR 42, CRP 0.56    Betadine solution soaked dressings to the left foot ulcers  Weightbearing in surgical shoe    IV antibiotics per ID    Left second toe ulcer probes to bone and lateral fifth MTP joint ulcer probes to capsule  Both ulcers are chronic and have not healed despite local wound care and oral antibiotics  There is some concern of osteomyelitis of the fifth metatarsal hence I ordered an MRI  Left foot MRI is pending    Discussed conservative and surgical treatment options with the patient at length including all risks, benefits and complications. Patient was amenable to surgical treatment. Will plan surgical plan pending MRI findings    Will follow    Thank you for the consult    Delma Vieira D.P.M.

## 2023-10-05 NOTE — PHYSICAL THERAPY NOTE
PT order received, chart reviewed. Pt admitted for possible surgical intervention. Will dc as no skilled therapy required at this time. Please reconsult after intervention if any changes in weight bearing or activity tolerance. RN aware.

## 2023-10-05 NOTE — PROGRESS NOTES
[facility]  placed referrals for psychiatry/psychotherapist in-network with patient's insurance. No safety concerns per RN.

## 2023-10-05 NOTE — PROGRESS NOTES
Occupational Therapy    OT order received, chart reviewed. Pt admitted for possible surgical intervention. Will dc as no skilled therapy required at this time. Please reconsult after intervention if any changes in weight bearing or activity tolerance. RN aware.

## 2023-10-05 NOTE — PLAN OF CARE
Assumed care at 1930  Left foot dressing, bearable pain, refused pain med  Consults to see  PT/OT  Labs in am.  Problem: Diabetes/Glucose Control  Goal: Glucose maintained within prescribed range  Description: INTERVENTIONS:  - Monitor Blood Glucose as ordered  - Assess for signs and symptoms of hyperglycemia and hypoglycemia  - Administer ordered medications to maintain glucose within target range  - Assess barriers to adequate nutritional intake and initiate nutrition consult as needed  - Instruct patient on self management of diabetes  Outcome: Progressing     Problem: Patient/Family Goals  Goal: Patient/Family Long Term Goal  Description: Patient's Long Term Goal:\"go home\"    Interventions:  - consults  -pain management  -IV antibiotic  - See additional Care Plan goals for specific interventions  Outcome: Progressing  Goal: Patient/Family Short Term Goal  Description: Patient's Short Term Goal:control pain    Interventions:   - pain med as needed  - See additional Care Plan goals for specific interventions  Outcome: Progressing     Problem: PAIN - ADULT  Goal: Verbalizes/displays adequate comfort level or patient's stated pain goal  Description: INTERVENTIONS:  - Encourage pt to monitor pain and request assistance  - Assess pain using appropriate pain scale  - Administer analgesics based on type and severity of pain and evaluate response  - Implement non-pharmacological measures as appropriate and evaluate response  - Consider cultural and social influences on pain and pain management  - Manage/alleviate anxiety  - Utilize distraction and/or relaxation techniques  - Monitor for opioid side effects  - Notify MD/LIP if interventions unsuccessful or patient reports new pain  - Anticipate increased pain with activity and pre-medicate as appropriate  Outcome: Progressing

## 2023-10-05 NOTE — CONSULTS
[address]    PATIENT'S NAME: Beverley Rosen STEPHANIE   ATTENDING PHYSICIAN: SHAKEEL Rosado MD   CONSULTING PHYSICIAN: Guanako Bustamante. Sissy Mark M.D. PATIENT ACCOUNT#:   [de-identified]    LOCATION:  [address] [place] [address]  MEDICAL RECORD #:   [id number]       YOB: 1970  ADMISSION DATE:       10/04/2023      CONSULT DATE:  10/05/2023    REPORT OF CONSULTATION    HISTORY OF PRESENT ILLNESS:  This is a 49-year-old woman who we were asked via Dr. Naomie Hayden to see. She is diabetic. It looks like she has some Charcot changes. She was seen by my associates and had a long course of IV treatment earlier this year, followed by oral Augmentin for a diabetic foot ulcer. Once the Augmentin was discontinued, however, she had relapse of infection, and this occurred despite having the tip of the toe amputated. As this has worsened, she has been readmitted, and I am asked to evaluate. She has no fever or chills. No other GI, , cardiovascular, CNS, or respiratory symptoms. PAST MEDICAL HISTORY:  Significant for the above. On the chart, there is talk of alopecia, alcoholism, B12 deficiency, fatty liver, blood pressure, cholesterol, migraines. MEDICATIONS:  Currently off antibiotics. ALLERGIES:  Latex, lisinopril, Pneumovax. SOCIAL HISTORY:  Alcohol as above. Negative for cigarettes for about 20 years. FAMILY HISTORY:  Nobody else ill. REVIEW OF SYSTEMS:  Weight is stable. PHYSICAL EXAMINATION:    GENERAL:  This is a well-developed female, resting in bed, in no acute distress. VITAL SIGNS:  She is afebrile. Vitals are stable. HEENT:  Pale conjunctivae. No oral lesions. NECK:  Supple. No JVD or adenopathy. LUNGS:  Clear. HEART:  I do not hear a murmur. ABDOMEN:  Somewhat overweight, nontender. No masses, rebound, or organomegaly. EXTREMITIES:  The left foot was wrapped. There appear to be Charcot changes.   The wrapping was not removed, but there is some drainage on the covering Kerlix. The ankle area is warmer on the involved side than the right, but not frankly erythematous. Photos were viewed. There are 2 wounds from toe tips and the lateral foot. LABORATORY DATA:  BUN and creatinine 16 and 1.09. ALT 40, bilirubin 0.4. White count 7.0, hemoglobin 10.3, platelets 473, polys 38, lymphs 30, monos 12, eos 18. CRP is 0.56. Previous eosinophil percent was 14. Blood cultures are negative. IMPRESSION:    1. Diabetic foot ulcers, cellulitis, and I suspect underlying osteomyelitis. Treatment will be continued with ceftriaxone and vancomycin. An MRI has been ordered. She says an arterial Doppler was done in May. This will be repeated. I would anticipate outpatient IV treatment once again. 2.   Eosinophilia. Unclear if this is med related or not. Further suggestions to follow. Thank you very much for allowing me to see this patient. Dictated By Shannen Avila M.D.  d: 10/05/2023 12:28:02  t: 10/05/2023 13:04:57  [facility] [id number]  [facility]/

## 2023-10-06 ENCOUNTER — APPOINTMENT (OUTPATIENT)
Dept: MRI IMAGING | Facility: HOSPITAL | Age: 53
End: 2023-10-06
Attending: PODIATRIST
Payer: COMMERCIAL

## 2023-10-06 ENCOUNTER — APPOINTMENT (OUTPATIENT)
Dept: GENERAL RADIOLOGY | Facility: HOSPITAL | Age: 53
End: 2023-10-06
Attending: PODIATRIST
Payer: COMMERCIAL

## 2023-10-06 ENCOUNTER — ANESTHESIA (OUTPATIENT)
Dept: SURGERY | Facility: HOSPITAL | Age: 53
End: 2023-10-06
Payer: COMMERCIAL

## 2023-10-06 ENCOUNTER — ANESTHESIA EVENT (OUTPATIENT)
Dept: SURGERY | Facility: HOSPITAL | Age: 53
End: 2023-10-06
Payer: COMMERCIAL

## 2023-10-06 LAB
ANION GAP SERPL CALC-SCNC: 2 MMOL/L (ref 0–18)
BASOPHILS # BLD AUTO: 0.05 X10(3) UL (ref 0–0.2)
BASOPHILS NFR BLD AUTO: 0.7 %
BUN BLD-MCNC: 14 MG/DL (ref 7–18)
CALCIUM BLD-MCNC: 8.6 MG/DL (ref 8.5–10.1)
CHLORIDE SERPL-SCNC: 110 MMOL/L (ref 98–112)
CO2 SERPL-SCNC: 29 MMOL/L (ref 21–32)
CREAT BLD-MCNC: 1.04 MG/DL
CREAT BLD-MCNC: 1.04 MG/DL
EGFRCR SERPLBLD CKD-EPI 2021: 64 ML/MIN/1.73M2 (ref 60–?)
EGFRCR SERPLBLD CKD-EPI 2021: 64 ML/MIN/1.73M2 (ref 60–?)
EOSINOPHIL # BLD AUTO: 1.24 X10(3) UL (ref 0–0.7)
EOSINOPHIL NFR BLD AUTO: 16.2 %
ERYTHROCYTE [DISTWIDTH] IN BLOOD BY AUTOMATED COUNT: 14.8 %
GLUCOSE BLD-MCNC: 125 MG/DL (ref 70–99)
GLUCOSE BLD-MCNC: 128 MG/DL (ref 70–99)
GLUCOSE BLD-MCNC: 136 MG/DL (ref 70–99)
GLUCOSE BLD-MCNC: 221 MG/DL (ref 70–99)
GLUCOSE BLD-MCNC: 85 MG/DL (ref 70–99)
HCT VFR BLD AUTO: 35.4 %
HGB BLD-MCNC: 10.5 G/DL
IMM GRANULOCYTES # BLD AUTO: 0.01 X10(3) UL (ref 0–1)
IMM GRANULOCYTES NFR BLD: 0.1 %
LYMPHOCYTES # BLD AUTO: 2.31 X10(3) UL (ref 1–4)
LYMPHOCYTES NFR BLD AUTO: 30.2 %
MCH RBC QN AUTO: 25.1 PG (ref 26–34)
MCHC RBC AUTO-ENTMCNC: 29.7 G/DL (ref 31–37)
MCV RBC AUTO: 84.7 FL
MONOCYTES # BLD AUTO: 0.83 X10(3) UL (ref 0.1–1)
MONOCYTES NFR BLD AUTO: 10.9 %
NEUTROPHILS # BLD AUTO: 3.2 X10 (3) UL (ref 1.5–7.7)
NEUTROPHILS # BLD AUTO: 3.2 X10(3) UL (ref 1.5–7.7)
NEUTROPHILS NFR BLD AUTO: 41.9 %
OSMOLALITY SERPL CALC.SUM OF ELEC: 294 MOSM/KG (ref 275–295)
PLATELET # BLD AUTO: 265 10(3)UL (ref 150–450)
POTASSIUM SERPL-SCNC: 4.1 MMOL/L (ref 3.5–5.1)
RBC # BLD AUTO: 4.18 X10(6)UL
SODIUM SERPL-SCNC: 141 MMOL/L (ref 136–145)
WBC # BLD AUTO: 7.6 X10(3) UL (ref 4–11)

## 2023-10-06 PROCEDURE — 0KBW0ZZ EXCISION OF LEFT FOOT MUSCLE, OPEN APPROACH: ICD-10-PCS | Performed by: PODIATRIST

## 2023-10-06 PROCEDURE — 73720 MRI LWR EXTREMITY W/O&W/DYE: CPT | Performed by: PODIATRIST

## 2023-10-06 PROCEDURE — 85025 COMPLETE CBC W/AUTO DIFF WBC: CPT | Performed by: HOSPITALIST

## 2023-10-06 PROCEDURE — 0Y6S0Z3 DETACHMENT AT LEFT 2ND TOE, LOW, OPEN APPROACH: ICD-10-PCS | Performed by: PODIATRIST

## 2023-10-06 PROCEDURE — 82962 GLUCOSE BLOOD TEST: CPT

## 2023-10-06 PROCEDURE — 82565 ASSAY OF CREATININE: CPT | Performed by: INTERNAL MEDICINE

## 2023-10-06 PROCEDURE — 76000 FLUOROSCOPY <1 HR PHYS/QHP: CPT | Performed by: PODIATRIST

## 2023-10-06 PROCEDURE — A9575 INJ GADOTERATE MEGLUMI 0.1ML: HCPCS | Performed by: HOSPITALIST

## 2023-10-06 PROCEDURE — 0QSN04Z REPOSITION RIGHT METATARSAL WITH INTERNAL FIXATION DEVICE, OPEN APPROACH: ICD-10-PCS | Performed by: PODIATRIST

## 2023-10-06 PROCEDURE — 0SNQ0ZZ RELEASE LEFT TOE PHALANGEAL JOINT, OPEN APPROACH: ICD-10-PCS | Performed by: PODIATRIST

## 2023-10-06 PROCEDURE — 88305 TISSUE EXAM BY PATHOLOGIST: CPT | Performed by: PODIATRIST

## 2023-10-06 PROCEDURE — 0QSP04Z REPOSITION LEFT METATARSAL WITH INTERNAL FIXATION DEVICE, OPEN APPROACH: ICD-10-PCS | Performed by: PODIATRIST

## 2023-10-06 PROCEDURE — 80048 BASIC METABOLIC PNL TOTAL CA: CPT | Performed by: HOSPITALIST

## 2023-10-06 PROCEDURE — 88311 DECALCIFY TISSUE: CPT | Performed by: PODIATRIST

## 2023-10-06 RX ORDER — INSULIN ASPART 100 [IU]/ML
INJECTION, SOLUTION INTRAVENOUS; SUBCUTANEOUS ONCE
Status: DISCONTINUED | OUTPATIENT
Start: 2023-10-06 | End: 2023-10-06 | Stop reason: HOSPADM

## 2023-10-06 RX ORDER — HYDROCODONE BITARTRATE AND ACETAMINOPHEN 5; 325 MG/1; MG/1
2 TABLET ORAL ONCE AS NEEDED
Status: DISCONTINUED | OUTPATIENT
Start: 2023-10-06 | End: 2023-10-06 | Stop reason: HOSPADM

## 2023-10-06 RX ORDER — SODIUM CHLORIDE, SODIUM LACTATE, POTASSIUM CHLORIDE, CALCIUM CHLORIDE 600; 310; 30; 20 MG/100ML; MG/100ML; MG/100ML; MG/100ML
INJECTION, SOLUTION INTRAVENOUS CONTINUOUS PRN
Status: DISCONTINUED | OUTPATIENT
Start: 2023-10-06 | End: 2023-10-06 | Stop reason: SURG

## 2023-10-06 RX ORDER — NALOXONE HYDROCHLORIDE 0.4 MG/ML
0.08 INJECTION, SOLUTION INTRAMUSCULAR; INTRAVENOUS; SUBCUTANEOUS AS NEEDED
Status: DISCONTINUED | OUTPATIENT
Start: 2023-10-06 | End: 2023-10-06 | Stop reason: HOSPADM

## 2023-10-06 RX ORDER — HYDROMORPHONE HYDROCHLORIDE 1 MG/ML
0.2 INJECTION, SOLUTION INTRAMUSCULAR; INTRAVENOUS; SUBCUTANEOUS EVERY 5 MIN PRN
Status: DISCONTINUED | OUTPATIENT
Start: 2023-10-06 | End: 2023-10-06 | Stop reason: HOSPADM

## 2023-10-06 RX ORDER — HYDROCODONE BITARTRATE AND ACETAMINOPHEN 5; 325 MG/1; MG/1
1 TABLET ORAL ONCE AS NEEDED
Status: DISCONTINUED | OUTPATIENT
Start: 2023-10-06 | End: 2023-10-06 | Stop reason: HOSPADM

## 2023-10-06 RX ORDER — GADOTERATE MEGLUMINE 376.9 MG/ML
20 INJECTION INTRAVENOUS
Status: COMPLETED | OUTPATIENT
Start: 2023-10-06 | End: 2023-10-06

## 2023-10-06 RX ORDER — ONDANSETRON 2 MG/ML
INJECTION INTRAMUSCULAR; INTRAVENOUS AS NEEDED
Status: DISCONTINUED | OUTPATIENT
Start: 2023-10-06 | End: 2023-10-06 | Stop reason: SURG

## 2023-10-06 RX ORDER — PROCHLORPERAZINE EDISYLATE 5 MG/ML
5 INJECTION INTRAMUSCULAR; INTRAVENOUS EVERY 8 HOURS PRN
Status: DISCONTINUED | OUTPATIENT
Start: 2023-10-06 | End: 2023-10-06 | Stop reason: HOSPADM

## 2023-10-06 RX ORDER — HYDROMORPHONE HYDROCHLORIDE 1 MG/ML
0.4 INJECTION, SOLUTION INTRAMUSCULAR; INTRAVENOUS; SUBCUTANEOUS EVERY 5 MIN PRN
Status: DISCONTINUED | OUTPATIENT
Start: 2023-10-06 | End: 2023-10-06 | Stop reason: HOSPADM

## 2023-10-06 RX ORDER — SODIUM CHLORIDE 9 MG/ML
INJECTION, SOLUTION INTRAVENOUS CONTINUOUS
Status: DISCONTINUED | OUTPATIENT
Start: 2023-10-06 | End: 2023-10-08

## 2023-10-06 RX ORDER — ACETAMINOPHEN 500 MG
1000 TABLET ORAL ONCE AS NEEDED
Status: DISCONTINUED | OUTPATIENT
Start: 2023-10-06 | End: 2023-10-06 | Stop reason: HOSPADM

## 2023-10-06 RX ORDER — LIDOCAINE HYDROCHLORIDE 10 MG/ML
INJECTION, SOLUTION EPIDURAL; INFILTRATION; INTRACAUDAL; PERINEURAL AS NEEDED
Status: DISCONTINUED | OUTPATIENT
Start: 2023-10-06 | End: 2023-10-06 | Stop reason: SURG

## 2023-10-06 RX ORDER — SODIUM CHLORIDE, SODIUM LACTATE, POTASSIUM CHLORIDE, CALCIUM CHLORIDE 600; 310; 30; 20 MG/100ML; MG/100ML; MG/100ML; MG/100ML
INJECTION, SOLUTION INTRAVENOUS CONTINUOUS
Status: DISCONTINUED | OUTPATIENT
Start: 2023-10-06 | End: 2023-10-06 | Stop reason: HOSPADM

## 2023-10-06 RX ORDER — ONDANSETRON 2 MG/ML
4 INJECTION INTRAMUSCULAR; INTRAVENOUS EVERY 6 HOURS PRN
Status: DISCONTINUED | OUTPATIENT
Start: 2023-10-06 | End: 2023-10-06 | Stop reason: HOSPADM

## 2023-10-06 RX ORDER — MIDAZOLAM HYDROCHLORIDE 1 MG/ML
INJECTION INTRAMUSCULAR; INTRAVENOUS AS NEEDED
Status: DISCONTINUED | OUTPATIENT
Start: 2023-10-06 | End: 2023-10-06 | Stop reason: SURG

## 2023-10-06 RX ORDER — HYDROMORPHONE HYDROCHLORIDE 1 MG/ML
0.6 INJECTION, SOLUTION INTRAMUSCULAR; INTRAVENOUS; SUBCUTANEOUS EVERY 5 MIN PRN
Status: DISCONTINUED | OUTPATIENT
Start: 2023-10-06 | End: 2023-10-06 | Stop reason: HOSPADM

## 2023-10-06 RX ADMIN — SODIUM CHLORIDE, SODIUM LACTATE, POTASSIUM CHLORIDE, CALCIUM CHLORIDE: 600; 310; 30; 20 INJECTION, SOLUTION INTRAVENOUS at 14:09:00

## 2023-10-06 RX ADMIN — LIDOCAINE HYDROCHLORIDE 50 MG: 10 INJECTION, SOLUTION EPIDURAL; INFILTRATION; INTRACAUDAL; PERINEURAL at 14:15:00

## 2023-10-06 RX ADMIN — MIDAZOLAM HYDROCHLORIDE 2 MG: 1 INJECTION INTRAMUSCULAR; INTRAVENOUS at 14:09:00

## 2023-10-06 RX ADMIN — ONDANSETRON 4 MG: 2 INJECTION INTRAMUSCULAR; INTRAVENOUS at 15:06:00

## 2023-10-06 NOTE — CM/SW NOTE
Pt discussed in rounds with RN. Pt will undergo surgery today, and will likely need IV abx for dc. SW initiated infusion referral in [place], will need order, final medication, and line information. SW to remain available for dc planning, or additional need for support. Addendum 1100:  [employer] informed SW pt is 100% covered but will need insurance auth closer to dc.        Dia Rowland, [facility]  Discharge Planner  [id number]

## 2023-10-06 NOTE — PLAN OF CARE
Pt transferred to  3603 via wheelchair. Report given to [facility].   All belongings sent,

## 2023-10-06 NOTE — PLAN OF CARE
Assumed care at 0730   at bedside  A/O x4  RA  No tele  VSS  Complains of moderate pain to L foot. PRN norco given per MAR. Pt to MRI around 1000  Dressing to L foot c/d/I - see flowsheets for full details  Non-cardiac EP  Daily weight complete this morning  NPO for surgery this afternoon  QID accucheck  Wears glasses and upper dentures  Plan for toe amputation at 1345. 0.9 @ 83 ml/hr ordered while pt is off unit at MRI - will connect once returned to unit   Up ad neri. Instructed to call for assistance. Pt updated. Will continue with plan of care. 1600: pt returned from procedure. Accucheck 85. Clarified with MD there is no need for tele monitoring post-procedure. IVF restarted. Dressing to L foot c/d/I. Per Dr. Kimberlee Briceno regarding wound care - \"Betadine solution soaked dressings, 4x4, Elder and tape. Protect K wire in the left foot. No Kerlix. No tight dressings\".      Problem: Diabetes/Glucose Control  Goal: Glucose maintained within prescribed range  Description: INTERVENTIONS:  - Monitor Blood Glucose as ordered  - Assess for signs and symptoms of hyperglycemia and hypoglycemia  - Administer ordered medications to maintain glucose within target range  - Assess barriers to adequate nutritional intake and initiate nutrition consult as needed  - Instruct patient on self management of diabetes  Outcome: Progressing     Problem: PAIN - ADULT  Goal: Verbalizes/displays adequate comfort level or patient's stated pain goal  Description: INTERVENTIONS:  - Encourage pt to monitor pain and request assistance  - Assess pain using appropriate pain scale  - Administer analgesics based on type and severity of pain and evaluate response  - Implement non-pharmacological measures as appropriate and evaluate response  - Consider cultural and social influences on pain and pain management  - Manage/alleviate anxiety  - Utilize distraction and/or relaxation techniques  - Monitor for opioid side effects  - Notify MD/LIP if interventions unsuccessful or patient reports new pain  - Anticipate increased pain with activity and pre-medicate as appropriate  Outcome: Progressing

## 2023-10-06 NOTE — ANESTHESIA POSTPROCEDURE EVALUATION
[address] Patient Status:  Inpatient   Age/Gender 48year old female MRN [id number]   [facility] SURGERY Attending [facility] Day # 2 PCP Barbara Bella MD       Anesthesia Post-op Note    PARTIAL SECOND TOE AMPUTATION, WOUND DEBRIDEMENT, FOURTH HAMMER TO CORRECTION OF LEFT FOOT, AND BILATERAL TAILOR'S BUNIONECTOMY    Procedure Summary       Date: 10/06/23 Room / Location: [facility] MAIN OR 03 / [facility] MAIN OR    Anesthesia Start: 1409 Anesthesia Stop: 0115    Procedure: PARTIAL SECOND TOE AMPUTATION, WOUND DEBRIDEMENT, FOURTH HAMMER TO CORRECTION OF LEFT FOOT, AND BILATERAL TAILOR'S BUNIONECTOMY (Bilateral: Foot) Diagnosis: (INPATIENT)    Surgeons: Derek Ramachandran DPM Anesthesiologist: Elia Anthony MD    Anesthesia Type: MAC ASA Status: 3            Anesthesia Type: MAC    Vitals Value Taken Time   /78 10/06/23 1525   Temp 98.1 10/06/23 1525   Pulse 60 10/06/23 1525   Resp 15 10/06/23 1525   SpO2 95 10/06/23 1525       Patient Location: PACU    Anesthesia Type: general    Airway Patency: patent    Postop Pain Control: adequate    Mental Status: preanesthetic baseline    Nausea/Vomiting: none    Cardiopulmonary/Hydration status: stable euvolemic    Complications: no apparent anesthesia related complications    Postop vital signs: stable    Dental Exam: Unchanged from Preop    Patient to be discharged from PACU when criteria met.

## 2023-10-06 NOTE — PROGRESS NOTES
A/O x 4  Room air  Vitals Q 8  Accucheck QID  L foot wrapped in Kerlix  Needs MRI L foot and US arterial BLE  [place] 1 tab for 7/10 pain  Surgical shoe for WBT  Vanco IV per [facility]  Will continue to monitor

## 2023-10-07 VITALS
OXYGEN SATURATION: 99 % | WEIGHT: 196.63 LBS | HEART RATE: 70 BPM | RESPIRATION RATE: 18 BRPM | BODY MASS INDEX: 33 KG/M2 | DIASTOLIC BLOOD PRESSURE: 85 MMHG | SYSTOLIC BLOOD PRESSURE: 132 MMHG | TEMPERATURE: 99 F

## 2023-10-07 LAB
ANION GAP SERPL CALC-SCNC: 2 MMOL/L (ref 0–18)
BASOPHILS # BLD AUTO: 0.05 X10(3) UL (ref 0–0.2)
BASOPHILS NFR BLD AUTO: 0.7 %
BUN BLD-MCNC: 13 MG/DL (ref 7–18)
CALCIUM BLD-MCNC: 8.4 MG/DL (ref 8.5–10.1)
CHLORIDE SERPL-SCNC: 113 MMOL/L (ref 98–112)
CO2 SERPL-SCNC: 27 MMOL/L (ref 21–32)
CREAT BLD-MCNC: 0.98 MG/DL
CREAT BLD-MCNC: 0.98 MG/DL
EGFRCR SERPLBLD CKD-EPI 2021: 69 ML/MIN/1.73M2 (ref 60–?)
EGFRCR SERPLBLD CKD-EPI 2021: 69 ML/MIN/1.73M2 (ref 60–?)
EOSINOPHIL # BLD AUTO: 0.76 X10(3) UL (ref 0–0.7)
EOSINOPHIL NFR BLD AUTO: 11.4 %
ERYTHROCYTE [DISTWIDTH] IN BLOOD BY AUTOMATED COUNT: 15 %
GLUCOSE BLD-MCNC: 113 MG/DL (ref 70–99)
GLUCOSE BLD-MCNC: 118 MG/DL (ref 70–99)
GLUCOSE BLD-MCNC: 120 MG/DL (ref 70–99)
HCT VFR BLD AUTO: 31.8 %
HGB BLD-MCNC: 9.9 G/DL
IMM GRANULOCYTES # BLD AUTO: 0.02 X10(3) UL (ref 0–1)
IMM GRANULOCYTES NFR BLD: 0.3 %
LYMPHOCYTES # BLD AUTO: 1.69 X10(3) UL (ref 1–4)
LYMPHOCYTES NFR BLD AUTO: 25.3 %
MCH RBC QN AUTO: 25.6 PG (ref 26–34)
MCHC RBC AUTO-ENTMCNC: 31.1 G/DL (ref 31–37)
MCV RBC AUTO: 82.4 FL
MONOCYTES # BLD AUTO: 0.67 X10(3) UL (ref 0.1–1)
MONOCYTES NFR BLD AUTO: 10 %
NEUTROPHILS # BLD AUTO: 3.49 X10 (3) UL (ref 1.5–7.7)
NEUTROPHILS # BLD AUTO: 3.49 X10(3) UL (ref 1.5–7.7)
NEUTROPHILS NFR BLD AUTO: 52.3 %
OSMOLALITY SERPL CALC.SUM OF ELEC: 295 MOSM/KG (ref 275–295)
PLATELET # BLD AUTO: 212 10(3)UL (ref 150–450)
POTASSIUM SERPL-SCNC: 4.3 MMOL/L (ref 3.5–5.1)
RBC # BLD AUTO: 3.86 X10(6)UL
SODIUM SERPL-SCNC: 142 MMOL/L (ref 136–145)
WBC # BLD AUTO: 6.7 X10(3) UL (ref 4–11)

## 2023-10-07 PROCEDURE — 85025 COMPLETE CBC W/AUTO DIFF WBC: CPT | Performed by: PODIATRIST

## 2023-10-07 PROCEDURE — 82565 ASSAY OF CREATININE: CPT | Performed by: PODIATRIST

## 2023-10-07 PROCEDURE — 80048 BASIC METABOLIC PNL TOTAL CA: CPT | Performed by: PODIATRIST

## 2023-10-07 PROCEDURE — 97116 GAIT TRAINING THERAPY: CPT

## 2023-10-07 PROCEDURE — 97161 PT EVAL LOW COMPLEX 20 MIN: CPT

## 2023-10-07 PROCEDURE — 82962 GLUCOSE BLOOD TEST: CPT

## 2023-10-07 RX ORDER — CEFADROXIL 500 MG/1
500 CAPSULE ORAL 2 TIMES DAILY
Qty: 14 CAPSULE | Refills: 0 | Status: SHIPPED | OUTPATIENT
Start: 2023-10-07 | End: 2023-10-14

## 2023-10-07 RX ORDER — HYDROCODONE BITARTRATE AND ACETAMINOPHEN 5; 325 MG/1; MG/1
1 TABLET ORAL EVERY 4 HOURS PRN
Qty: 30 TABLET | Refills: 0 | Status: SHIPPED | OUTPATIENT
Start: 2023-10-07

## 2023-10-07 NOTE — PROGRESS NOTES
Assumed care at 7:30am.   Patient states she was having some pain on her feet bilaterally but it was better than the day before. She requested 1 tab of Norco vs 2. Alert and oriented x4. Able to walk well and be discharged home. Per PT. Waiting on podiatry and ID to round for possible discharge today. All safety precautions in place. Will continue to monitor patient.

## 2023-10-07 NOTE — PHYSICAL THERAPY NOTE
PHYSICAL THERAPY EVALUATION - INPATIENT     Room Number: 3678/2559-L  Evaluation Date: 10/7/2023  Type of Evaluation: Initial  Physician Order: PT Eval and Treat    Presenting Problem: s/p partial 2nd toe amp, wound debridement, and 4th hammer toe correction L foot, B bunionectomy 10/6/23  Co-Morbidities : DM2, diabetic neuropathy, asthma, hx of amp 3rd toe L foot 5/28/23  Reason for Therapy: Mobility Dysfunction and Discharge Planning      ASSESSMENT   Pt is a 48year old female admitted on 10/4/2023 for above procedures. Functional outcome measures completed include [facility]. The AM-PAC '6-Clicks' Inpatient Basic Mobility Short Form was completed and this patient is demonstrating a Approx Degree of Impairment: 35.83%  degree of impairment in mobility. Research supports that patients with this level of impairment may benefit from discharge home with family, RW for home use. PT Discharge Recommendations: Home      PLAN  Patient has been evaluated and presents with no skilled Physical Therapy needs at this time. Patient discharged from Physical Therapy services. Please re-order if a new functional limitation presents during this admission. GOALS  Patient was able to achieve the following goals . .. Patient was able to transfer Safely and independently   Patient able to ambulate on level surfaces Safely and independently with RW         HOME SITUATION  Type of Home: House   Home Layout: Two level  Stairs to Enter : 1     Stairs to [employer]: 14  Railing: Yes    Lives With: Family (spouse and adult daughter)  Drives: Yes  Patient Owned Equipment: None       Prior Level of [place]: per pt reports prior to admit pt was amb without assistive device. Pt lives with family who can assist with needs when they are not at work. Pt was driving prior to admit. Pt denies recent fall hx. SUBJECTIVE  \"It hurts a little more this time. \"      OBJECTIVE  Precautions:  Other (Comment) (B post op shoes)  Fall Risk: Standard fall risk    WEIGHT BEARING RESTRICTION  Weight Bearing Restriction: R lower extremity;L lower extremity        R Lower Extremity: Weight Bearing as Tolerated (with post op shoe)  L Lower Extremity: Weight Bearing as Tolerated (with post op shoe)    PAIN ASSESSMENT  Ratin  Location: L foot >R  Management Techniques: Repositioning    COGNITION  Overall Cognitive Status:  WFL - within functional limits    RANGE OF MOTION AND STRENGTH ASSESSMENT  Upper extremity ROM and strength are within functional limits     Lower extremity ROM is within functional limits   Lower extremity strength is within functional limits       BALANCE  Static Sitting: Good  Dynamic Sitting: Good  Static Standing: Fair +  Dynamic Standing: Fair    ADDITIONAL TESTS                                    ACTIVITY TOLERANCE                         O2 WALK       NEUROLOGICAL FINDINGS  Neurological Findings: None                     AM-PAC '6-Clicks' INPATIENT SHORT FORM - BASIC MOBILITY  How much difficulty does the patient currently have. .. Patient Difficulty: Turning over in bed (including adjusting bedclothes, sheets and blankets)?: None   Patient Difficulty: Sitting down on and standing up from a chair with arms (e.g., wheelchair, bedside commode, etc.): A Little   Patient Difficulty: Moving from lying on back to sitting on the side of the bed?: None   How much help from another person does the patient currently need. ..    Help from Another: Moving to and from a bed to a chair (including a wheelchair)?: A Little   Help from Another: Need to walk in hospital room?: A Little   Help from Another: Climbing 3-5 steps with a railing?: A Little       AM-PAC Score:  Raw Score: 20   Approx Degree of Impairment: 35.83%   Standardized Score (AM-PAC Scale): 47.67   CMS Modifier (G-Code): CJ    FUNCTIONAL ABILITY STATUS  Gait Assessment   Functional Mobility/Gait Assessment  Gait Assistance: Modified independent  Distance (ft): 75  Assistive Device: Rolling walker  Pattern: Within Functional Limits  Stairs: Stairs  How Many Stairs: 3  Device: 1 Rail  Assist: Supervision  Pattern: Ascend and Descend  Ascend and Descend : Step to    Skilled Therapy Provided     Bed Mobility:  Rolling: indep  Supine to sit: indep   Sit to supine: indep     Transfer Mobility:  Sit to stand: modified indep   Stand to sit: modified indep  Gait = pt amb x 75 feet with RW initially with supervision and progressed to modified indep    Therapist's comments:per RN pt ok to be seen. Pt received in bed with B post op shoes in place. Pt denies dizziness. Pt mobility as above. Pt instructed in sequence for stair neg with 1 rail and able to return safe demonstration with supervision. Pt returns to room and to bed. Pt educated on recommendations for RW for balance due to pain and call don't fall. Pt issued RW for home use. Exercise/Education Provided:  Bed mobility  Functional activity tolerated  Gait training  Transfer training    Patient End of Session: In bed;Needs met;Call light within reach;RN aware of session/findings; All patient questions and concerns addressed    Patient Evaluation Complexity Level:  History Moderate - 1 or 2 personal factors and/or co-morbidities   Examination of body systems Low - addressing 1-2 elements   Clinical Presentation Low - Stable   Clinical Decision Making Low Complexity       PT Session Time: 30 minutes  Gait Training: 15 minutes

## 2023-10-07 NOTE — PLAN OF CARE
A/O x 4  Room air  Vitals Q 8  Accucheck QID  L foot wrapped in Kerlix, ace wrapped. Dressing noted to R foot as well.  Dressing changes daily   Needs US arterial BLE  [place] 2 tab for 7/10 pain  Surgical shoe for WBT bilaterally,   Vanco IV per MAR  Up ad neri  Will continue to monitor          Problem: Diabetes/Glucose Control  Goal: Glucose maintained within prescribed range  Description: INTERVENTIONS:  - Monitor Blood Glucose as ordered  - Assess for signs and symptoms of hyperglycemia and hypoglycemia  - Administer ordered medications to maintain glucose within target range  - Assess barriers to adequate nutritional intake and initiate nutrition consult as needed  - Instruct patient on self management of diabetes  Outcome: Progressing     Problem: Patient/Family Goals  Goal: Patient/Family Long Term Goal  Description: Patient's Long Term Goal:\"go home\"    Interventions:  - consults  -pain management  -IV antibiotic  - See additional Care Plan goals for specific interventions  Outcome: Progressing  Goal: Patient/Family Short Term Goal  Description: Patient's Short Term Goal:control pain    Interventions:   - pain med as needed  - See additional Care Plan goals for specific interventions  Outcome: Progressing     Problem: PAIN - ADULT  Goal: Verbalizes/displays adequate comfort level or patient's stated pain goal  Description: INTERVENTIONS:  - Encourage pt to monitor pain and request assistance  - Assess pain using appropriate pain scale  - Administer analgesics based on type and severity of pain and evaluate response  - Implement non-pharmacological measures as appropriate and evaluate response  - Consider cultural and social influences on pain and pain management  - Manage/alleviate anxiety  - Utilize distraction and/or relaxation techniques  - Monitor for opioid side effects  - Notify MD/LIP if interventions unsuccessful or patient reports new pain  - Anticipate increased pain with activity and pre-medicate as appropriate  Outcome: Progressing

## (undated) DEVICE — BANDAGE,GAUZE,CONFORMING,4"X75",STRL,LF: Brand: MEDLINE

## (undated) DEVICE — SLEEVE KENDALL SCD EXPRESS MED

## (undated) DEVICE — MEGADYNE ELECTRODE ADULT PT RT

## (undated) DEVICE — HANDPIECE SET WITH HIGH FLOW TIP AND SUCTION TUBE: Brand: INTERPULSE

## (undated) DEVICE — SUT ETHILON 3-0 PS-1 1663H

## (undated) DEVICE — STERILE SYNTHETIC POLYISOPRENE POWDER-FREE SURGICAL GLOVES WITH HYDROGEL COATING: Brand: PROTEXIS

## (undated) DEVICE — STRL PENROSE DRAIN 18" X 1/2": Brand: CARDINAL HEALTH

## (undated) DEVICE — 450 ML BOTTLE OF 0.05% CHLORHEXIDINE GLUCONATE IN 99.95% STERILE WATER FOR IRRIGATION, USP AND APPLICATOR.: Brand: IRRISEPT ANTIMICROBIAL WOUND LAVAGE

## (undated) DEVICE — SOL NACL IRRIG 0.9% 1000ML BTL

## (undated) DEVICE — LOWER EXTREMITY CDS-LF: Brand: MEDLINE INDUSTRIES, INC.

## (undated) DEVICE — BLADE SAW KM33-11

## (undated) DEVICE — SUT VICRYL 4-0 SH J315H

## (undated) DEVICE — PAD,ABDOMINAL,8"X7.5",ST,LF,20/BX: Brand: MEDLINE INDUSTRIES, INC.

## (undated) DEVICE — STANDARD HYPODERMIC NEEDLE,POLYPROPYLENE HUB: Brand: MONOJECT

## (undated) DEVICE — 3M™ STERI-DRAPE™ U-DRAPE 1015: Brand: STERI-DRAPE™